# Patient Record
Sex: FEMALE | Race: WHITE | Employment: STUDENT | URBAN - METROPOLITAN AREA
[De-identification: names, ages, dates, MRNs, and addresses within clinical notes are randomized per-mention and may not be internally consistent; named-entity substitution may affect disease eponyms.]

---
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
## 2021-04-01 ENCOUNTER — HOSPITAL ENCOUNTER (INPATIENT)
Age: 21
LOS: 4 days | Discharge: HOME | DRG: 753 | End: 2021-04-05
Attending: PSYCHIATRY & NEUROLOGY | Admitting: PSYCHIATRY & NEUROLOGY
Payer: MEDICAID

## 2021-04-01 PROBLEM — F32.9 MDD (MAJOR DEPRESSIVE DISORDER): Status: ACTIVE | Noted: 2021-04-01

## 2021-04-01 PROCEDURE — 65220000003 HC RM SEMIPRIVATE PSYCH

## 2021-04-01 PROCEDURE — 74011250637 HC RX REV CODE- 250/637: Performed by: NURSE PRACTITIONER

## 2021-04-01 RX ORDER — DIPHENHYDRAMINE HYDROCHLORIDE 50 MG/ML
50 INJECTION, SOLUTION INTRAMUSCULAR; INTRAVENOUS
Status: DISCONTINUED | OUTPATIENT
Start: 2021-04-01 | End: 2021-04-05 | Stop reason: HOSPADM

## 2021-04-01 RX ORDER — HALOPERIDOL 5 MG/ML
5 INJECTION INTRAMUSCULAR
Status: DISCONTINUED | OUTPATIENT
Start: 2021-04-01 | End: 2021-04-05 | Stop reason: HOSPADM

## 2021-04-01 RX ORDER — TRAZODONE HYDROCHLORIDE 50 MG/1
50 TABLET ORAL
Status: DISCONTINUED | OUTPATIENT
Start: 2021-04-01 | End: 2021-04-02

## 2021-04-01 RX ORDER — LORAZEPAM 2 MG/ML
1 INJECTION INTRAMUSCULAR
Status: DISCONTINUED | OUTPATIENT
Start: 2021-04-01 | End: 2021-04-05 | Stop reason: HOSPADM

## 2021-04-01 RX ORDER — LAMOTRIGINE 25 MG/1
25 TABLET ORAL DAILY
Status: DISCONTINUED | OUTPATIENT
Start: 2021-04-02 | End: 2021-04-05 | Stop reason: HOSPADM

## 2021-04-01 RX ORDER — OLANZAPINE 5 MG/1
5 TABLET ORAL
Status: DISCONTINUED | OUTPATIENT
Start: 2021-04-01 | End: 2021-04-05 | Stop reason: HOSPADM

## 2021-04-01 RX ORDER — BENZTROPINE MESYLATE 1 MG/1
1 TABLET ORAL
Status: DISCONTINUED | OUTPATIENT
Start: 2021-04-01 | End: 2021-04-05 | Stop reason: HOSPADM

## 2021-04-01 RX ORDER — ADHESIVE BANDAGE
30 BANDAGE TOPICAL DAILY PRN
Status: DISCONTINUED | OUTPATIENT
Start: 2021-04-01 | End: 2021-04-05 | Stop reason: HOSPADM

## 2021-04-01 RX ORDER — ACETAMINOPHEN 325 MG/1
650 TABLET ORAL
Status: DISCONTINUED | OUTPATIENT
Start: 2021-04-01 | End: 2021-04-05 | Stop reason: HOSPADM

## 2021-04-01 RX ORDER — HYDROXYZINE 50 MG/1
50 TABLET, FILM COATED ORAL
Status: DISCONTINUED | OUTPATIENT
Start: 2021-04-01 | End: 2021-04-05 | Stop reason: HOSPADM

## 2021-04-01 RX ADMIN — TRAZODONE HYDROCHLORIDE 50 MG: 50 TABLET ORAL at 22:12

## 2021-04-01 NOTE — PROGRESS NOTES
Pt arrived at [phone] via EMR  Pt is VOL  Dual skin assesment was performed by Frankie Westbrook, RN and Jarrett Cole RN. Assessment was WDL. Silvano score is 23  Pt did consent to safety while on the unit   Did sign consent forms up on arriving   Denies suicidal ideation   Denies homicidal ideation   Does not appear to be responding to internal stimuli   UDS was negative    Belongings searched and secured by staff    Pt was calm and cooperative during admission process. Will continue to monitor and support q15min        Problem: Falls - Risk of  Goal: *Absence of Falls  Description: Document Mehnaz Rodriguez Fall Risk and appropriate interventions in the flowsheet.   Outcome: Progressing Towards Goal  Note: Fall Risk Interventions:            Medication Interventions: Teach patient to arise slowly       Pt awake in bed, NAD, even respirations, will continue to monitor q15min           0492 Hospitalist paged    0558 [facility] aware

## 2021-04-01 NOTE — PROGRESS NOTES
Problem: Depressed Mood (Adult/Pediatric)  Goal: *STG: Participates in treatment plan  Outcome: Progressing Towards Goal  Note: Up on unit quietly present. Brighter affect when engaged. Depressed mood with increase anxiety. Describes recent breakup with significant other as primary stressor. Intrusive thoughts to self harm have increased. Discussed mood stabilizer benefits such as Lamictal. Pt verbalized understanding. Daily goal is to talk with family and orient to unit.  Staff focus is on offering support and medication education  Goal: *STG: Attends activities and groups  Outcome: Progressing Towards Goal  Goal: *STG: Demonstrates reduction in symptoms and increase in insight into coping skills/future focused  Outcome: Progressing Towards Goal  Goal: *STG: Complies with medication therapy  Outcome: Progressing Towards Goal  Goal: Interventions  Outcome: Progressing Towards Goal

## 2021-04-01 NOTE — INTERDISCIPLINARY ROUNDS
Behavioral Health Interdisciplinary Rounds     Patient Name: Anaya Hansen  Age: 24 y.o. Room/Bed:  731/  Primary Diagnosis: <principal problem not specified>   Admission Status: Voluntary     Readmission within 30 days: no  Power of  in place: no  Patient requires a blocked bed: no          Reason for blocked bed:     VTE Prophylaxis: No    Mobility needs/Fall risk: no  Flu Vaccine : no   Nutritional Plan: no  Consults:          Labs/Testing due today?: no    Sleep hours: 0       Participation in Care/Groups:  New Admit  Medication Compliant?: New Admit  PRNS (last 24 hours): None    Restraints (last 24 hours):  no     CIWA (range last 24 hours):     COWS (range last 24 hours):      Alcohol screening (AUDIT) completed -   AUDIT Score: 4     If applicable, date SBIRT discussed in treatment team AND documented:   AUDIT Screen Score: AUDIT Score: 4    Tobacco - patient is a smoker: Have You Used Tobacco in the Past 30 Days: Yes  Illegal Drugs use: Have You Used Any Illegal Substances Over the Past 12 Months: Yes    24 hour chart check complete: yes     Patient goal(s) for today:   Treatment team focus/goals: Plan to assess for medications and discharge needs. Progress note :She was pleasant and compliant with treatment team.  Agreed to start on medications. LOS:  0  Expected LOS: TBD     Financial concerns/prescription coverage:  [employer]   Family contact:     Mother, Marielos Grossman ([phone])  Father, Gladis Santana ([phone]) SW tried to reach her dad - unable to leave a message      Family requesting physician contact today:    Discharge plan: she will return to her brothers home in [address] [place] [address] to weapons :  Yes       Outpatient provider(s): TBD   Patient's preferred phone number for follow up call :   Patient's preferred e-mail address :    Participating treatment team members: Anaya Hansen, Mike Navarrete - Dr. Melody Severance
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
## 2021-04-01 NOTE — BH NOTES
TRANSFER - IN REPORT:    Verbal report received from Richmond MONTEZ RN(name) on Bella Robert  being received from [facility] ED(unit) for routine progression of care      Report consisted of patients Situation, Background, Assessment and   Recommendations(SBAR). Information from the following report(s) SBAR was reviewed with the receiving nurse. Opportunity for questions and clarification was provided. Assessment completed upon patients arrival to unit and care assumed.

## 2021-04-01 NOTE — CONSULTS
[facility] Adult  Hospitalist Group  History and Physical    Primary Care Provider: Damien Sheriff MD  Date of Service:  4/1/2021    Subjective:     Cecy Cho is a 24 y.o. female with a past medical history of anxiety, depression and ADD presented to [facility] ED with complaints of suicidal ideations that have been increasing over the last month. She was accompanied by her father. She states that she recently started cutting again over the last one month. She was transferred to [address] for further treatment. Hospitalist was consulted for H&P and medical management. History taken from patient and chart. Patient cooperative throughout examination. Denies any medical complaints at this time. States that she is tired because the book she is reading is boring. Denies any syncope, dizziness, shortness of breath, chest pain or tightness, nausea, vomiting or diarrhea. Denies any pain. Review of Systems:    A comprehensive review of systems was negative except for that written in the History of Present Illness. Past Medical History:   Diagnosis Date    Depression     Functional murmur     Self mutilating behavior       History reviewed. No pertinent surgical history. Prior to Admission medications    Medication Sig Start Date End Date Taking? Authorizing Provider   hydrOXYzine HCl (ATARAX) 50 mg tablet Take 1 Tab by mouth every six (6) hours as needed. 6/9/17  Yes Jaime Oseguera MD     Allergies   Allergen Reactions    Latex, Natural Rubber Unknown (comments)    Pcn [Penicillins] Rash    Tree Nut Swelling      History reviewed. No pertinent family history. SOCIAL HISTORY:  Patient resides at Home with brother. Works in [employer]. Does not have any children  Patient ambulates with Independently . Smoking history: Denies   Alcohol history: Drinks ETOH \"socially\", 1 time per week   Ellict drug history: Denies.  UDS negative        Objective:       Physical Exam: Visit Vitals  BP 99/68   Pulse 85   Temp 97.6 °F (36.4 °C)   Resp 16   Ht 5' 4\" (1.626 m)   Wt 47.6 kg (105 lb)   SpO2 99%   Breastfeeding No   BMI 18.02 kg/m²     General appearance: alert, cooperative, no distress, appears stated age  Lungs: clear to auscultation bilaterally  Heart: regular rate and rhythm, S1, S2 normal, no murmur, click, rub or gallop  Abdomen: soft, non-tender. Bowel sounds normal. No masses,  no organomegaly  Extremities: extremities normal, atraumatic, no cyanosis or edema  Pulses: 2+ and symmetric  Skin: Skin color, texture, turgor normal. No rashes or lesions  Neurologic: Grossly normal  Cap refill: Brisk, less than 3 seconds  Pulses: 2+, symmetric in all extremities    Data Review: All diagnostic labs and studies have been reviewed. Assessment:     Active Problems:    MDD (major depressive disorder) (4/1/2021)        Plan:     1. Major depressive disorder / SI/ Anxiety   - Management per primary team         FUNCTIONAL STATUS PRIOR TO HOSPITALIZATION (including history of recent falls): Independent   Thank you for allowing us to participate in patient's care. If you have any questions or need futher assistance, please do not hesitate to contact us again. We will sign off now.      Signed By: Ger Philip NP     April 1, 2021

## 2021-04-01 NOTE — BH NOTES
GROUP THERAPY PROGRESS NOTE     Patient did not participate in self-care group.      HUBER Turner, Supervisee in Social Work

## 2021-04-01 NOTE — H&P
[address] [facility] HISTORY AND PHYSICAL    Name:  Carlton Villaseñor  MR#:  [id number]  :  2000  ACCOUNT #:  [de-identified]  ADMIT DATE:  2021    INITIAL PSYCHIATRIC INTERVIEW    CHIEF COMPLAINT:  \"I was feeling very down. \"    HISTORY OF PRESENT ILLNESS:  The patient is a 80-year-old  female who is currently admitted after she was brought to the ER at [address] [place] [address] by her father and was transferred to us for further care. She reports that she moved to [place] about a year ago, and it has been a stressful transition. Over the past month, she has been feeling increasingly depressed, started cutting again and cut herself on the forearm at least four times in the past month. Prior to that, she had not cut herself for almost 2 years. States that she is having trouble sleeping and has low energy and poor motivation. She has been crying in the shower, feels hopeless about her life and started having thoughts of suicide including by trying to veer her car into traffic and end her life that way. She denies use of alcohol or other recreational substances. Urine drug screen is negative. It was recently the 4th anniversary of the death of her ex-boyfriend who shot himself without any warning to end his life by suicide. States that she still has trouble when she recalls this. Also broke up with a boyfriend recently which was an additional factor. States that she has been off her medications for 2 years and only takes Vistaril occasionally when she feels her anxiety is getting out of control. The patient gives a history of depressive episodes interspersed with periods of mood lability, anger dyscontrol, irritability, impulsive shopping and fast driving. States that she feels more and more like her biological mother who was diagnosed with bipolar disorder. Of note, I see the patient's father Bucky Taylor in my office for treatment, and she was made aware of this.     PAST MEDICAL HISTORY: Reviewed as per the history and physical exam.      Past Medical History:   Diagnosis Date    Depression     Functional murmur     Self mutilating behavior      Prior to Admission medications    Medication Sig Start Date End Date Taking? Authorizing Provider   hydrOXYzine HCl (ATARAX) 50 mg tablet Take 1 Tab by mouth every six (6) hours as needed. 6/9/17  Yes Paola Mark MD     Vitals:    04/01/21 1115 04/01/21 1700 04/01/21 2020 04/02/21 0845   BP: 99/68 97/60 (!) 95/57 94/64   Pulse: 85 80 78 72   Resp: 16 16 16 16   Temp: 97.6 °F (36.4 °C) 98.1 °F (36.7 °C) 98.1 °F (36.7 °C) 98.6 °F (37 °C)   SpO2: 99% 98% 96% 98%   Weight:       Height:         Lab Results   Component Value Date/Time    Hemoglobin (POC) 14.0 08/10/2012 12:40 PM     No results found for: NA, K, CL, CO2, AGAP, GLU, BUN, CREA, BUCR, GFRAA, GFRNA, CA, TBIL, TBILI, AP, TP, ALB, GLOB, AGRAT, ALT, AST  No results found for: VALF2, VALAC, VALP, VALPR, DS6, CRBAM, CRBAMP, CARB2, XCRBAM  No results found for: LITHM  RADIOLOGY REPORTS:(reviewed/updated 4/2/2021)  No results found. PAST PSYCHIATRIC HISTORY:  The patient was hospitalized at the age of 14 after a suicide attempt in [place]. She had been intermittently in treatment including with antidepressants, but over the past 2 years, she has abandoned this and has not seen a therapist also. There is no prior history of substance abuse. She cut herself a couple of times in 2015, but has not done it again until 2 years ago and then stopped again for 2 years. PSYCHOSOCIAL HISTORY:  The patient currently lives in [facility] where she lives in an apartment with her brother. She works at a [email or web address] in [facility] where she is employed as a . States that her job is somewhat stressful, but she still enjoys it. Her father is very supportive of her, and he asked her to come down to [facility] so that she could be hospitalized.   Her parents are  for many years, and as noted above, the mother has a diagnosis of bipolar disorder. Denies any major legal or financial stressors. MENTAL STATUS EXAM:  The patient is a young  female who is dressed in casual street clothes. She is calm, pleasant and cooperative and makes good eye contact. Speech is spontaneous and coherent. Mood is reported as being down, and affect is depressed. Psychomotor activity is within normal limits. Denies any active suicidal ideation or plan. Passive suicidal ideation is present. Denies any perceptual abnormalities. Denies any delusions. Her thought process is logical and goal-directed. Cognitively, she is awake and alert, oriented to TPP. Fund of knowledge is adequate. Insight is poor. Judgment is poor. ASSESSMENT AND PLAN/DIAGNOSIS:  Mood disorder, unspecified; rule out bipolar II disorder, rule out borderline personality disorder, rule out recurrent major depression. At the present time, I will continue her inpatient stay. She will be provided with support and attend groups. Estimated length of stay is 5-7 days. Her strengths include her ability to seek help and support from her father.       MD ANJALI [id number]  D:  04/01/2021 13:35  T:  04/01/2021 15:18  JOB #:  [id number]

## 2021-04-01 NOTE — BH NOTES
GROUP THERAPY PROGRESS NOTE    Patient did not participate in substance abuse group. She remained isolated in her bedroom, in her bed.      HUBER Cox, Supervisee in Social Work

## 2021-04-02 PROCEDURE — 74011250637 HC RX REV CODE- 250/637: Performed by: PSYCHIATRY & NEUROLOGY

## 2021-04-02 PROCEDURE — 65220000003 HC RM SEMIPRIVATE PSYCH

## 2021-04-02 RX ORDER — TRAZODONE HYDROCHLORIDE 100 MG/1
100 TABLET ORAL
Status: DISCONTINUED | OUTPATIENT
Start: 2021-04-02 | End: 2021-04-05 | Stop reason: HOSPADM

## 2021-04-02 RX ADMIN — LAMOTRIGINE 25 MG: 25 TABLET ORAL at 08:53

## 2021-04-02 RX ADMIN — TRAZODONE HYDROCHLORIDE 100 MG: 100 TABLET ORAL at 21:38

## 2021-04-02 NOTE — BH NOTES
2200: PRN Medication Documentation    Specific patient behavior that led to need for PRN medication: inability to sleep  Staff interventions attempted prior to PRN being given: calming techniques  PRN medication given: PO trazadone  Patient response/effectiveness of PRN medication: will continue to monitor

## 2021-04-02 NOTE — PROGRESS NOTES
Problem: Falls - Risk of  Goal: *Absence of Falls  Description: Document Ishmael Ceron Fall Risk and appropriate interventions in the flowsheet.   Outcome: Progressing Towards Goal  Note: Fall Risk Interventions:            Medication Interventions: Teach patient to arise slowly       Pt appears asleep in bed, NAD, even respirations, will continue to monitor q15min

## 2021-04-02 NOTE — BH NOTES
GROUP THERAPY PROGRESS NOTE     Patient did not attend process group.      HUBER Mattson, Supervisee in Social Work

## 2021-04-02 NOTE — PROGRESS NOTES
Problem: Depressed Mood (Adult/Pediatric)  Goal: *STG: Participates in treatment plan  Outcome: Progressing Towards Goal  Pt participates in all therapeutic activities and verbalizes her needs appropriately.

## 2021-04-02 NOTE — INTERDISCIPLINARY ROUNDS
Behavioral Health Interdisciplinary Rounds     Patient Name: Yung Mcgee  Age: 24 y.o. Room/Bed:  731/  Primary Diagnosis: <principal problem not specified>   Admission Status: Voluntary     Readmission within 30 days: no  Power of  in place: no  Patient requires a blocked bed: no          Reason for blocked bed:     VTE Prophylaxis: No    Mobility needs/Fall risk: no  Flu Vaccine : no   Nutritional Plan: no  Consults:          Labs/Testing due today?: no    Sleep hours: 8       Participation in Care/Groups:  yes  Medication Compliant?: Yes  PRNS (last 24 hours): Sleep Aid    Restraints (last 24 hours):  no     CIWA (range last 24 hours):     COWS (range last 24 hours):      Alcohol screening (AUDIT) completed -   AUDIT Score: 4     If applicable, date SBIRT discussed in treatment team AND documented:   AUDIT Screen Score: AUDIT Score: 4      Tobacco - patient is a smoker: Have You Used Tobacco in the Past 30 Days: Yes  Illegal Drugs use: Have You Used Any Illegal Substances Over the Past 12 Months: Yes    24 hour chart check complete: yes     Patient goal(s) for today:   Treatment team focus/goals: Plan to titrate her medications  Progress note : She has been doing well on the unit and has been participating in unit activities . She denies SI, denies thoughts of cutting. She has been isolating in her room. LOS:  1  Expected LOS: plan for discharge on Monday     Financial concerns/prescription coverage:  [employer] Medicaid   Family contact:  Mayi Ross ([phone])       Family requesting physician contact today:    Discharge plan: she will return home   Access to weapons : yes, - lives in her brothers home and has access to weapons      Outpatient provider(s): TBD   Patient's preferred phone number for follow up call :   Patient's preferred e-mail address :[email or web address]   Participating treatment team members: Mj Wells Dr., RN

## 2021-04-02 NOTE — PROGRESS NOTES
Problem: Depressed Mood (Adult/Pediatric)  Goal: *STG: Participates in treatment plan  Outcome: Progressing Towards Goal  Note: Out on unit brighter affect, reports feeling safe and anxiety is tolerable. Discussed medications pt verbalized understanding. Reports sleep slightly improved. Daily goal is to attend groups.  Staff focus is on offering support and coping skills education  Goal: *STG: Attends activities and groups  Outcome: Progressing Towards Goal  Goal: *STG: Demonstrates reduction in symptoms and increase in insight into coping skills/future focused  Outcome: Progressing Towards Goal  Goal: *STG: Complies with medication therapy  Outcome: Progressing Towards Goal  Goal: Interventions  Outcome: Progressing Towards Goal

## 2021-04-02 NOTE — BH NOTES
Chief Complaint:  I feel okay today. Length of Stay: 1 Days    Interval History:  Donna reports feeling slightly better today. Says she slept poorly last night and feels fatigued today. Remains somewhat isolative to her room and encouraged to participate in the milieu. Denies any adverse events from her medications and has not needed any PRN's except for Trazodone which did not help much. No AH or VH are present.  Passive SI remains but feels safe in the hospital.       Past Medical History:  Past Medical History:   Diagnosis Date    Depression     Functional murmur     Self mutilating behavior            Labs:  Lab Results   Component Value Date/Time    Hemoglobin (POC) 14.0 08/10/2012 12:40 PM    No results found for: NA, K, CL, CO2, AGAP, GLU, BUN, CREA, BUCR, GFRAA, GFRNA, CA, TBIL, TBILI, AP, TP, ALB, GLOB, AGRAT, ALT   Vitals:    04/01/21 1115 04/01/21 1700 04/01/21 2020 04/02/21 0845   BP: 99/68 97/60 (!) 95/57 94/64   Pulse: 85 80 78 72   Resp: 16 16 16 16   Temp: 97.6 °F (36.4 °C) 98.1 °F (36.7 °C) 98.1 °F (36.7 °C) 98.6 °F (37 °C)   SpO2: 99% 98% 96% 98%   Weight:       Height:             Current Facility-Administered Medications   Medication Dose Route Frequency Provider Last Rate Last Admin    traZODone (DESYREL) tablet 100 mg  100 mg Oral QHS Annie James MD        OLANZapine (ZyPREXA) tablet 5 mg  5 mg Oral Q6H PRN Turner Baez, NP        haloperidol lactate (HALDOL) injection 5 mg  5 mg IntraMUSCular Q6H PRN Turner Baezom, NP        benztropine (COGENTIN) tablet 1 mg  1 mg Oral BID PRN Turner Baezom, NP        diphenhydrAMINE (BENADRYL) injection 50 mg  50 mg IntraMUSCular BID PRN Turner Baezom, NP        hydrOXYzine HCL (ATARAX) tablet 50 mg  50 mg Oral TID PRN Alva Baez, NP        LORazepam (ATIVAN) injection 1 mg  1 mg IntraMUSCular Q4H PRN Turner Baez, NP        acetaminophen (TYLENOL) tablet 650 mg  650 mg Oral Q4H PRN Michele Baez, NP        magnesium hydroxide (MILK OF MAGNESIA) 400 mg/5 mL oral suspension 30 mL  30 mL Oral DAILY PRN Alva Baez, NP        lamoTRIgine (LaMICtal) tablet 25 mg  25 mg Oral DAILY Can Mckeon MD   25 mg at 04/02/21 0853         Mental Status Exam:  Eye contact: fair  Grooming: fair  Psychomotor activity: Decreased slightly. Speech is spontaneous, coherent  Mood is \"fair\"  Affect: reactive  Perception: Denies any AH or VH  Suicidal ideation: passive SI +  Cognition is grossly intact       Physical Exam:  Body habitus: Body mass index is 18.02 kg/m². Musculoskeletal system: normal gait  Tremor - neg  Cog wheeling - neg      Assessment and Plan:  Donna Steel meets criteria for a diagnosis of Mood disorder, unspecified; rule out bipolar II disorder, rule out borderline personality disorder, rule out recurrent major depression. Increased Trazodone to 100mg at bedtime. Continue the medication regimen as prescribed  Disposition planning to continue. A coordinated, multidisplinary treatment team round was conducted with the patient, nurses, pharmcist,  and writer present. Discussions held with , and/or with family members; Complete current electronic health record for patient was reviewed in full including consultant notes, ancillary staff notes, nurses and tech notes, labs and vitals. I certify that this patients inpatient psychiatric hospital services furnished since the previous certification were, and continue to be, required for treatment that could reasonably be expected to improve the patient's condition, or for diagnostic study, and that the patient continues to need, on a daily basis, active treatment furnished directly by or requiring the supervision of inpatient psychiatric facility personnel.  In addition, the hospital records show that services furnished were intensive treatment services, admission or related services, or equivalent services.

## 2021-04-02 NOTE — BH NOTES
GROUP THERAPY PROGRESS NOTE     Patient did not attend coping skills group     Nicci Baker MSW, Supervisee in Social Work

## 2021-04-03 PROCEDURE — 65220000003 HC RM SEMIPRIVATE PSYCH

## 2021-04-03 PROCEDURE — 74011250637 HC RX REV CODE- 250/637: Performed by: PSYCHIATRY & NEUROLOGY

## 2021-04-03 PROCEDURE — 74011250637 HC RX REV CODE- 250/637: Performed by: NURSE PRACTITIONER

## 2021-04-03 RX ADMIN — LAMOTRIGINE 25 MG: 25 TABLET ORAL at 10:07

## 2021-04-03 RX ADMIN — TRAZODONE HYDROCHLORIDE 100 MG: 100 TABLET ORAL at 21:25

## 2021-04-03 RX ADMIN — HYDROXYZINE HYDROCHLORIDE 50 MG: 50 TABLET, FILM COATED ORAL at 17:49

## 2021-04-03 NOTE — PROGRESS NOTES
Pt appears to be sleeping. No distress noted. Respirations WNL. Will continue to monitor q15 mins for safety. Problem: Falls - Risk of  Goal: *Absence of Falls  Description: Document Eder Plascencia Fall Risk and appropriate interventions in the flowsheet.   Outcome: Progressing Towards Goal  Note: Fall Risk Interventions:            Medication Interventions: Teach patient to arise slowly, Evaluate medications/consider consulting pharmacy          Sleep= 7

## 2021-04-03 NOTE — BH NOTES
Chief Complaint:  I feel good today, I have been tired but I am feeling better. Length of Stay: 2 Days    Interval History:  Donna reports feeling good today. She has been more present on the unit, up in milieu and participating in groups. Reports that she slept better last night with increased dose of trazodone. She denies any side effects from medications, discussed what possible reactions to look for, patient verbalized understand. Denies SI, HI or AVH. She is currently discharge focused and requesting information about what time she will be discharged on Monday.      Past Medical History:  Past Medical History:   Diagnosis Date    Depression     Functional murmur     Self mutilating behavior            Labs:  Lab Results   Component Value Date/Time    Hemoglobin (POC) 14.0 08/10/2012 12:40 PM    No results found for: NA, K, CL, CO2, AGAP, GLU, BUN, CREA, BUCR, GFRAA, GFRNA, CA, TBIL, TBILI, AP, TP, ALB, GLOB, AGRAT, ALT   Vitals:    04/02/21 0845 04/02/21 1646 04/02/21 2046 04/03/21 0922   BP: 94/64 100/64 95/69 100/63   Pulse: 72 69 75 83   Resp: 16 16 16 16   Temp: 98.6 °F (37 °C) 98.6 °F (37 °C) 98.2 °F (36.8 °C) 98.3 °F (36.8 °C)   SpO2: 98% 98% 97% 97%   Weight:       Height:             Current Facility-Administered Medications   Medication Dose Route Frequency Provider Last Rate Last Admin    traZODone (DESYREL) tablet 100 mg  100 mg Oral QHS Micheline Lara MD   100 mg at 04/02/21 2138    OLANZapine (ZyPREXA) tablet 5 mg  5 mg Oral Q6H PRN Ton Baez, NP        haloperidol lactate (HALDOL) injection 5 mg  5 mg IntraMUSCular Q6H PRN Ton Baez, NP        benztropine (COGENTIN) tablet 1 mg  1 mg Oral BID PRN Tno Baez, NP        diphenhydrAMINE (BENADRYL) injection 50 mg  50 mg IntraMUSCular BID PRN Hassan-Cha, Alva D, NP        hydrOXYzine HCL (ATARAX) tablet 50 mg  50 mg Oral TID PRN Ton Baez, NP  LORazepam (ATIVAN) injection 1 mg  1 mg IntraMUSCular Q4H PRN Raeann Baez NP        acetaminophen (TYLENOL) tablet 650 mg  650 mg Oral Q4H PRN Raeann Baez, NP        magnesium hydroxide (MILK OF MAGNESIA) 400 mg/5 mL oral suspension 30 mL  30 mL Oral DAILY PRN Alva Baez, REMEDIOS        lamoTRIgine (LaMICtal) tablet 25 mg  25 mg Oral DAILY Ashley Pascual MD   25 mg at 04/03/21 1007         Mental Status Exam:  Eye contact: fair  Grooming: fair  Psychomotor activity: Decreased slightly. Speech is spontaneous, coherent  Mood is \"fair\"  Affect: reactive  Perception: Denies any AH or VH  Suicidal ideation:Denies SI. Cognition is grossly intact       Physical Exam:  Body habitus: Body mass index is 18.02 kg/m². Musculoskeletal system: normal gait  Tremor - neg  Cog wheeling - neg      Assessment and Plan:  Donna Steel meets criteria for a diagnosis of Mood disorder, unspecified; rule out bipolar II disorder, rule out borderline personality disorder, rule out recurrent major depression. Reports that increased dose of trazodone was beneficial for sleep   Continue the medication regimen as prescribed  Disposition planning to continue. A coordinated, multidisplinary treatment team round was conducted with the patient, nurses, pharmcist,  and writer present. Discussions held with , and/or with family members; Complete current electronic health record for patient was reviewed in full including consultant notes, ancillary staff notes, nurses and tech notes, labs and vitals.   I certify that this patients inpatient psychiatric hospital services furnished since the previous certification were, and continue to be, required for treatment that could reasonably be expected to improve the patient's condition, or for diagnostic study, and that the patient continues to need, on a daily basis, active treatment furnished directly by or requiring the supervision of inpatient psychiatric facility personnel. In addition, the hospital records show that services furnished were intensive treatment services, admission or related services, or equivalent services.

## 2021-04-03 NOTE — PROGRESS NOTES
PRN Medication Documentation    Specific patient behavior that led to need for PRN medication: Restless, anxious, patient reports, \"mood going down. \"     Staff interventions attempted prior to PRN being given: Therapeutic communication, use of coping skills, being around others, socializing     PRN medication given: Atarax 50mg PO     Patient response/effectiveness of PRN medication: Will continue to monitor. Problem: Depressed Mood (Adult/Pediatric)  Goal: *STG: Participates in treatment plan  Outcome: Progressing Towards Goal  Goal: *STG: Attends activities and groups  Outcome: Progressing Towards Goal  Goal: *STG: Demonstrates reduction in symptoms and increase in insight into coping skills/future focused  Outcome: Progressing Towards Goal    1600: Patient received awake and alert and sitting quietly in the dining room watching TV. Mood and affect; calm, cooperative, smiling and pleasant. Denies SI/HI. Will continue to monitor q15 minutes for safety checks.

## 2021-04-03 NOTE — PROGRESS NOTES
Problem: Falls - Risk of  Goal: *Absence of Falls  Description: Document Aissatou Vazquezidel Fall Risk and appropriate interventions in the flowsheet.   Outcome: Progressing Towards Goal  Note: Fall Risk Interventions:            Medication Interventions: Teach patient to arise slowly, Evaluate medications/consider consulting pharmacy

## 2021-04-03 NOTE — PROGRESS NOTES
[facility]  Master Treatment Plan for Vinod & Luca    Date Treatment Plan Initiated: 4/3/21    Treatment Plan Modalities:  Type of Modality Amount  (x minutes) Frequency (x/week) Duration (x days) Name of Responsible Staff   [address] [facility] meetings to encourage peer interactions 15 7 1 OBIE Fung     Group psychotherapy to assist in building coping skills and internal controls 60 7 1 Cesar Gong   Therapeutic activity groups to build coping skills 60 7 1 Cesar Gong   Psychoeducation in group setting to address:   Medication education   [address] PharmD   Coping skills   30 3 1 Cesar Gong   Relaxation techniques         Symptom management         Discharge planning   60 2 [facility]    60 2 1 Chaplain PEÑA   61 1 1 Volunteer of [facility]/AA/NA         Physician medication management   13 7 1 Dr. Vini Saunders meeting/discharge planning   15 2 1 Mary Sánchez and Nicci Perez                                Goal will be met by 4/6/21:    Problem: Falls - Risk of  Goal: *Absence of Falls  Description: Document Carlene Fall Risk and appropriate interventions in the flowsheet. Outcome: Progressing Towards Goal  Note: Fall Risk Interventions:            Medication Interventions: Teach patient to arise slowly                   Problem: Patient Education: Go to Patient Education Activity  Goal: Patient/Family Education  Outcome: Progressing Towards Goal     Problem: Depressed Mood (Adult/Pediatric)  Goal: *STG: Participates in treatment plan  Outcome: Progressing Towards Goal  Note: Pt participated in treatment team. Able to sleep last night. Interacting with peers and staff. Goal: *STG: Attends activities and groups  Outcome: Progressing Towards Goal  Note: Attending groups on the unit and participating.   Goal: *STG: Demonstrates reduction in symptoms and increase in insight into coping skills/future focused  Outcome: Progressing Towards Goal  Note: Pt feeling less depressed at this time. No thoughts of wanting to harm herself. Encouraged pt to journal feelings. Goal: *STG: Complies with medication therapy  Outcome: Progressing Towards Goal  Note: Taking medications as scheduled.   Goal: Interventions  Outcome: Progressing Towards Goal     Problem: Patient Education: Go to Patient Education Activity  Goal: Patient/Family Education  Outcome: Progressing Towards Goal

## 2021-04-03 NOTE — SUICIDE SAFETY PLAN
SAFETY PLAN    A suicide Safety Plan is a document that supports someone when they are having thoughts of suicide. Warning Signs that indicate a suicidal crisis may be developing: What (situations, thoughts, feelings, body sensations, behaviors, etc.) do you experience that lets you know you are beginning to think about suicide? 1. Break up  2. Feeling too stressed from work  3. Feeling lonely    Internal Coping Strategies:  What things can I do (relaxation techniques, hobbies, physical activities, etc.) to take my mind off my problems without contacting another person? 1. Going for a walk with Sarah  2. Hanging out with Friends  3. Doing yoga with [place] Cons and social settings that provide distraction: Who can I call or where can I go to distract me? 1. Name: Mom  Phone: [phone]  2. Name: Dad  Phone: [phone]  3. Place: Going for a walk          4. Place: Laying in the sun    People whom I can ask for help: Who can I call when I need help - for example, friends, family, clergy, someone else? 1. Name: Mom            Phone: [phone]  2. Name: Mayi  Phone: [phone]  3. Name: Maria Elena Dukes Phone: in cell phone    Professionals or [employer] agencies I can contact during a crisis: Who can I call for help - for example, my doctor, my psychiatrist, my psychologist, a mental health provider, a suicide hotline? 1. Clinician Name:    Phone:       Clinician Pager or Emergency Contact #:     2. Clinician Name:    Phone:       Clinician Pager or Emergency Contact #:     3. Suicide Prevention Lifeline: 6-030-045-TALK (7095)    4. [address] [place] [address] Emergency Services -  for example, [facility] suicide hotline, [facility] Hotline:   [address]        Emergency Services Address:       Emergency Services Phone:     Making the environment safe: How can I make my environment (house/apartment/living space) safer?  For example, can I remove guns, medications, and other items? 1. Lock up guns  2.  Take medication and start getting therapy

## 2021-04-03 NOTE — BH NOTES
GROUP THERAPY PROGRESS NOTE     Patient is participating in Discharge/Goals/Community Meeting Group. Group time: 50 minutes     Personal goal for participation: Process feelings related to discharge and/or feelings/goals for today. Goal orientation: Personal     Group therapy participation: active     Therapeutic interventions reviewed and discussed: Group discussion was focused on discharge plans and anxiety related to this. Group members discussed what they planned to do once discharge and discharge plans. Discussion also related to support and communication issues that arise. Group members verbalized how they are feeling today, their personal goal for today, and goals for the week. Patients were given an opportunity to share any concerns and issues they were having. Patients completed safety plan. Impression of participation: Donna actively participated in group. Patient completed her safety plan. Calm, pleasant and cooperative in group. Pt reports no questions about discharge.       Cesar Gong, Supervisee in Social Work

## 2021-04-04 PROCEDURE — 74011250637 HC RX REV CODE- 250/637: Performed by: PSYCHIATRY & NEUROLOGY

## 2021-04-04 PROCEDURE — 74011250637 HC RX REV CODE- 250/637: Performed by: NURSE PRACTITIONER

## 2021-04-04 PROCEDURE — 65220000003 HC RM SEMIPRIVATE PSYCH

## 2021-04-04 RX ADMIN — TRAZODONE HYDROCHLORIDE 100 MG: 100 TABLET ORAL at 21:48

## 2021-04-04 RX ADMIN — HYDROXYZINE HYDROCHLORIDE 50 MG: 50 TABLET, FILM COATED ORAL at 21:48

## 2021-04-04 RX ADMIN — LAMOTRIGINE 25 MG: 25 TABLET ORAL at 10:37

## 2021-04-04 NOTE — PROGRESS NOTES
Problem: Falls - Risk of  Goal: *Absence of Falls  Description: Document Wilsey Fall Risk and appropriate interventions in the flowsheet. Outcome: Progressing Towards Goal  Note: Fall Risk Interventions:       Medication Interventions: Teach patient to arise slowly    Problem: Patient Education: Go to Patient Education Activity  Goal: Patient/Family Education  Outcome: Progressing Towards Goal     Problem: Depressed Mood (Adult/Pediatric)  Goal: *STG: Participates in treatment plan  Outcome: Progressing Towards Goal  Note: Pt participated in treatment team. Stated she was \"tossing and turning\" last night. Pt has concerns regarding her medications not working after 5 pm. Reviewed medications during treatment team. Denies any thoughts of wanting to harm herself. Contact her parents yesterday. Mood is brighter.    Goal: Interventions  Outcome: Progressing Towards Goal     Problem: Patient Education: Go to Patient Education Activity  Goal: Patient/Family Education  Outcome: Progressing Towards Goal

## 2021-04-04 NOTE — BH NOTES
Chief Complaint:  I am feeling good, just tired. Length of Stay: 3 Days    Interval History:  Donna reports feeling good today. She has been up in milieu and participating in groups. Reports that sleep was intermittent last night, reports that she was tossing and turning. She denies any side effects from medications, discussed what possible reactions to look for, patient verbalized understand. Denies SI, HI or AVH. Reports that she has talked to her mom and dad and has plans to have dinner with her mom tomorrow after discharge. Reports that she has plans to go back to [facility] to live with her brother and then she plans to return to work on Tuesday.      Past Medical History:  Past Medical History:   Diagnosis Date    Depression     Functional murmur     Self mutilating behavior            Labs:  Lab Results   Component Value Date/Time    Hemoglobin (POC) 14.0 08/10/2012 12:40 PM    No results found for: NA, K, CL, CO2, AGAP, GLU, BUN, CREA, BUCR, GFRAA, GFRNA, CA, TBIL, TBILI, AP, TP, ALB, GLOB, AGRAT, ALT   Vitals:    04/03/21 0922 04/03/21 1134 04/03/21 1630 04/04/21 0829   BP: 100/63 100/67 101/68 97/64   Pulse: 83 61 67 71   Resp: 16 16 16 16   Temp: 98.3 °F (36.8 °C) 98.2 °F (36.8 °C) 97.8 °F (36.6 °C) 97.9 °F (36.6 °C)   SpO2: 97% 98% 100% 99%   Weight:    49.8 kg (109 lb 12.8 oz)   Height:             Current Facility-Administered Medications   Medication Dose Route Frequency Provider Last Rate Last Admin    traZODone (DESYREL) tablet 100 mg  100 mg Oral QHS Naif Figueredo MD   100 mg at 04/03/21 2125    OLANZapine (ZyPREXA) tablet 5 mg  5 mg Oral Q6H PRN Eloisa Baez NP        haloperidol lactate (HALDOL) injection 5 mg  5 mg IntraMUSCular Q6H PRN Alva Baez NP        benztropine (COGENTIN) tablet 1 mg  1 mg Oral BID PRN Eloisa Baez, REMEDIOS        diphenhydrAMINE (BENADRYL) injection 50 mg  50 mg IntraMUSCular BID PRN Jeyson ROMEO, REMEDIOS        hydrOXYzine HCL (ATARAX) tablet 50 mg  50 mg Oral TID PRN Alva Baez NP   50 mg at 04/03/21 1749    LORazepam (ATIVAN) injection 1 mg  1 mg IntraMUSCular Q4H PRN Dustin Baez NP        acetaminophen (TYLENOL) tablet 650 mg  650 mg Oral Q4H PRN Dustin Baez NP        magnesium hydroxide (MILK OF MAGNESIA) 400 mg/5 mL oral suspension 30 mL  30 mL Oral DAILY PRN Alva Baez NP        lamoTRIgine (LaMICtal) tablet 25 mg  25 mg Oral DAILY Za Mac MD   25 mg at 04/03/21 1007         Mental Status Exam:  Eye contact: fair  Grooming: fair  Psychomotor activity: Decreased slightly. Speech is spontaneous, coherent  Mood is \"fair\"  Affect: reactive  Perception: Denies any AH or VH  Suicidal ideation:Denies SI. Cognition is grossly intact       Physical Exam:  Body habitus: Body mass index is 18.85 kg/m². Musculoskeletal system: normal gait  Tremor - neg  Cog wheeling - neg      Assessment and Plan:  Donna Steel meets criteria for a diagnosis of Mood disorder, unspecified; rule out bipolar II disorder, rule out borderline personality disorder, rule out recurrent major depression. Reports that increased dose of trazodone was beneficial for sleep   Continue the medication regimen as prescribed  Disposition planning to continue. A coordinated, multidisplinary treatment team round was conducted with the patient, nurses, pharmcist,  and writer present. Discussions held with , and/or with family members; Complete current electronic health record for patient was reviewed in full including consultant notes, ancillary staff notes, nurses and tech notes, labs and vitals.   I certify that this patients inpatient psychiatric hospital services furnished since the previous certification were, and continue to be, required for treatment that could reasonably be expected to improve the patient's condition, or for diagnostic study, and that the patient continues to need, on a daily basis, active treatment furnished directly by or requiring the supervision of inpatient psychiatric facility personnel. In addition, the hospital records show that services furnished were intensive treatment services, admission or related services, or equivalent services.       MARGOT Gallegos

## 2021-04-04 NOTE — PROGRESS NOTES
Problem: Patient Education: Go to Patient Education Activity  Goal: Patient/Family Education  Outcome: Progressing Towards Goal     Problem: Depressed Mood (Adult/Pediatric)  Goal: Interventions  Outcome: Progressing Towards Goal     Problem: Patient Education: Go to Patient Education Activity  Goal: Patient/Family Education  Outcome: Progressing Towards Goal

## 2021-04-04 NOTE — PROGRESS NOTES
Pt resting in bed, appears asleep. NAD. Respirations even and unlabored. Will continue to monitor q15 mins for safety. Problem: Falls - Risk of  Goal: *Absence of Falls  Description: Document Edgar Villaseñor Fall Risk and appropriate interventions in the flowsheet.   Outcome: Progressing Towards Goal  Note: Fall Risk Interventions:            Medication Interventions: Teach patient to arise slowly         Sleep= 6

## 2021-04-04 NOTE — BH NOTES
GROUP THERAPY PROGRESS NOTE     Patient did not participate in Self-Care Group.       Cesar Gong, Supervisee in Social Work

## 2021-04-05 VITALS
WEIGHT: 109.8 LBS | DIASTOLIC BLOOD PRESSURE: 58 MMHG | HEIGHT: 64 IN | SYSTOLIC BLOOD PRESSURE: 93 MMHG | OXYGEN SATURATION: 95 % | RESPIRATION RATE: 16 BRPM | TEMPERATURE: 97.8 F | HEART RATE: 84 BPM | BODY MASS INDEX: 18.74 KG/M2

## 2021-04-05 PROCEDURE — 74011250637 HC RX REV CODE- 250/637: Performed by: PSYCHIATRY & NEUROLOGY

## 2021-04-05 RX ORDER — LAMOTRIGINE 25 MG/1
TABLET ORAL
Qty: 79 TAB | Refills: 0 | Status: SHIPPED | OUTPATIENT
Start: 2021-04-05 | End: 2022-09-01

## 2021-04-05 RX ORDER — HYDROXYZINE 50 MG/1
50 TABLET, FILM COATED ORAL
Qty: 90 TAB | Refills: 1 | Status: SHIPPED | OUTPATIENT
Start: 2021-04-05 | End: 2022-09-01

## 2021-04-05 RX ORDER — LAMOTRIGINE 150 MG/1
150 TABLET ORAL DAILY
Qty: 30 TAB | Refills: 0 | Status: SHIPPED | OUTPATIENT
Start: 2021-04-05 | End: 2022-09-01

## 2021-04-05 RX ORDER — TRAZODONE HYDROCHLORIDE 100 MG/1
100 TABLET ORAL
Qty: 30 TAB | Refills: 1 | Status: SHIPPED | OUTPATIENT
Start: 2021-04-05 | End: 2022-09-01

## 2021-04-05 RX ADMIN — LAMOTRIGINE 25 MG: 25 TABLET ORAL at 08:54

## 2021-04-05 NOTE — PROGRESS NOTES
Problem: Falls - Risk of  Goal: *Absence of Falls  Description: Document Edward [place] Fall Risk and appropriate interventions in the flowsheet. Outcome: Progressing Towards Goal  Note: Fall Risk Interventions:     Medication Interventions: Teach patient to arise slowly     Problem: Patient Education: Go to Patient Education Activity  Goal: Patient/Family Education  Outcome: Progressing Towards Goal     Problem: Depressed Mood (Adult/Pediatric)  Goal: *STG: Participates in treatment plan  Outcome: Progressing Towards Goal  Note: Pt out out on the unit and interacting with peers. Mood is brighter and stated she is feeling less depressed. Denies any thoughts of self harm. Focused on discharge. Attending groups during the shift.    Goal: Interventions  Outcome: Progressing Towards Goal     Problem: Patient Education: Go to Patient Education Activity  Goal: Patient/Family Education  Outcome: Progressing Towards Goal

## 2021-04-05 NOTE — PROGRESS NOTES
Pharmacist Discharge Medication Reconciliation    Discharging Provider: Dr. Godwin Garcia PMH:   Past Medical History:   Diagnosis Date    Depression     Functional murmur     Self mutilating behavior      Chief Complaint for this Admission: No chief complaint on file. Allergies: Latex, natural rubber; Pcn [penicillins]; and Tree nut    Discharge Medications:   Current Discharge Medication List        START taking these medications    Details   !! lamoTRIgine (LaMICtal) 25 mg tablet Take 1 tablet daily for 9 days, 1 tab BID for 7 days, 2 tabs BID for 14 days. Indications: mood  Qty: 79 Tab, Refills: 0      traZODone (DESYREL) 100 mg tablet Take 1 Tab by mouth nightly. Indications: insomnia associated with depression  Qty: 30 Tab, Refills: 1      !! lamoTRIgine (LaMICtal) 150 mg tablet Take 1 Tab by mouth daily. Indications: bipolar depression  Qty: 30 Tab, Refills: 0       !! - Potential duplicate medications found. Please discuss with provider. CONTINUE these medications which have CHANGED    Details   hydrOXYzine HCL (ATARAX) 50 mg tablet Take 1 Tab by mouth every six (6) hours as needed for Anxiety.  Indications: anxious  Qty: 90 Tab, Refills: 1           The patient's chart, MAR and AVS were reviewed by Ivan Ring, FABRICIO.

## 2021-04-05 NOTE — INTERDISCIPLINARY ROUNDS
Behavioral Health Interdisciplinary Rounds     Patient Name: Nixon Arcos  Age: 24 y.o. Room/Bed:  731/  Primary Diagnosis: <principal problem not specified>   Admission Status: Voluntary     Readmission within 30 days: no  Power of  in place: no  Patient requires a blocked bed: no          Reason for blocked bed:     VTE Prophylaxis: No    Mobility needs/Fall risk: no  Flu Vaccine :    Nutritional Plan: no  Consults:          Labs/Testing due today?: no    Sleep hours:  6      Participation in Care/Groups:  yes  Medication Compliant?: Yes  PRNS (last 24 hours): Antianxiety    Restraints (last 24 hours):  no     CIWA (range last 24 hours):     COWS (range last 24 hours):      Alcohol screening (AUDIT) completed -   AUDIT Score: 4     If applicable, date SBIRT discussed in treatment team AND documented:   AUDIT Screen Score: AUDIT Score: 4    Tobacco - patient is a smoker: Have You Used Tobacco in the Past 30 Days: Yes  Illegal Drugs use: Have You Used Any Illegal Substances Over the Past 12 Months: Yes    24 hour chart check complete: yes     Patient goal(s) for today:   Treatment team focus/goals: Plan for discharge today. Progress note : She has been doing well on the unit, no behavioral issues     LOS:  4  Expected LOS: Today     Financial concerns/prescription coverage:  [employer] +   Family contact:  Brother - [phone]      Family requesting physician contact today:    Discharge plan:  She will return home with her brother   Access to weapons :  Yes, call brother  HANS spoke to her brother and he has secured the weapons in the home        Outpatient provider(s): [address] [place] [address]   Patient's preferred phone number for follow up call :   Patient's preferred e-mail address :  Participating treatment team members: Charly Nathan Dr.

## 2021-04-05 NOTE — DISCHARGE SUMMARY
DISCHARGE SUMMARY    Some parts of the discharge summary are from the initial Psychiatric interview that was done on admission by the admitting psychiatrist.     Date of Admission: 4/1/2021    Date of Discharge: 4/5/2021     TYPE OF DISCHARGE:   REGULAR -  YES    AMA  RELEASED BY THE [facility] COURT    CHIEF COMPLAINT:  \"I was feeling very down. \"     HISTORY OF PRESENT ILLNESS:  The patient is a 71-year-old  female who is currently admitted after she was brought to the ER at [facility] by her father and was transferred to us for further care. She reports that she moved to [place] about a year ago, and it has been a stressful transition. Over the past month, she has been feeling increasingly depressed, started cutting again and cut herself on the forearm at least four times in the past month. Prior to that, she had not cut herself for almost 2 years. States that she is having trouble sleeping and has low energy and poor motivation. She has been crying in the shower, feels hopeless about her life and started having thoughts of suicide including by trying to veer her car into traffic and end her life that way. She denies use of alcohol or other recreational substances. Urine drug screen is negative. It was recently the 4th anniversary of the death of her ex-boyfriend who shot himself without any warning to end his life by suicide. States that she still has trouble when she recalls this. Also broke up with a boyfriend recently which was an additional factor. States that she has been off her medications for 2 years and only takes Vistaril occasionally when she feels her anxiety is getting out of control. The patient gives a history of depressive episodes interspersed with periods of mood lability, anger dyscontrol, irritability, impulsive shopping and fast driving. States that she feels more and more like her biological mother who was diagnosed with bipolar disorder.   Of note, I see the patient's father Beny Mendoza in my office for treatment, and she was made aware of this.     PAST MEDICAL HISTORY:  Reviewed as per the history and physical exam.             Past Medical History:   Diagnosis Date    Depression      Functional murmur      Self mutilating behavior                Prior to Admission medications    Medication Sig Start Date End Date Taking? Authorizing Provider   hydrOXYzine HCl (ATARAX) 50 mg tablet Take 1 Tab by mouth every six (6) hours as needed. 6/9/17   Yes Jaime Oseguera MD             Vitals:     04/01/21 1115 04/01/21 1700 04/01/21 2020 04/02/21 0845   BP: 99/68 97/60 (!) 95/57 94/64   Pulse: 85 80 78 72   Resp: 16 16 16 16   Temp: 97.6 °F (36.4 °C) 98.1 °F (36.7 °C) 98.1 °F (36.7 °C) 98.6 °F (37 °C)   SpO2: 99% 98% 96% 98%   Weight:           Height:                    Lab Results   Component Value Date/Time     Hemoglobin (POC) 14.0 08/10/2012 12:40 PM      No results found for: NA, K, CL, CO2, AGAP, GLU, BUN, CREA, BUCR, GFRAA, GFRNA, CA, TBIL, TBILI, AP, TP, ALB, GLOB, AGRAT, ALT, AST  No results found for: VALF2, VALAC, VALP, VALPR, DS6, CRBAM, CRBAMP, CARB2, XCRBAM  No results found for: LITHM  RADIOLOGY REPORTS:(reviewed/updated 4/2/2021)  No results found.        PAST PSYCHIATRIC HISTORY:  The patient was hospitalized at the age of 14 after a suicide attempt in [place]. She had been intermittently in treatment including with antidepressants, but over the past 2 years, she has abandoned this and has not seen a therapist also. There is no prior history of substance abuse. She cut herself a couple of times in 2015, but has not done it again until 2 years ago and then stopped again for 2 years.     PSYCHOSOCIAL HISTORY:  The patient currently lives in [facility] where she lives in an apartment with her brother. She works at a [email or web address] in [facility] where she is employed as a . States that her job is somewhat stressful, but she still enjoys it.   Her father is very supportive of her, and he asked her to come down to [place] so that she could be hospitalized. Her parents are  for many years, and as noted above, the mother has a diagnosis of bipolar disorder. Denies any major legal or financial stressors.     MENTAL STATUS EXAM:  The patient is a young  female who is dressed in casual street clothes. She is calm, pleasant and cooperative and makes good eye contact. Speech is spontaneous and coherent. Mood is reported as being down, and affect is depressed. Psychomotor activity is within normal limits. Denies any active suicidal ideation or plan. Passive suicidal ideation is present. Denies any perceptual abnormalities. Denies any delusions. Her thought process is logical and goal-directed. Cognitively, she is awake and alert, oriented to TPP. Fund of knowledge is adequate. Insight is poor. Judgment is poor.     ASSESSMENT AND PLAN/DIAGNOSIS:  Mood disorder, unspecified; rule out bipolar II disorder, rule out borderline personality disorder, rule out recurrent major depression.     At the present time, I will continue her inpatient stay. She will be provided with support and attend groups. Estimated length of stay is 5-7 days. Her strengths include her ability to seek help and support from her father. COURSE IN THE HOSPITAL:    Patient was admitted to the inpatient psychiatry unit for acute psychiatric stabilization in regards to symptomatology as described in the HPI above and placed on Q15 minute checks and withdrawal precautions. While on the unit Jamin Kelley was involved in individual, group, occupational and milieu therapy. She was started back on her usual medication regimen as well as PRN medications including Lamotrigine, Trazodone, Vistaril for anxiety. She improved gradually and was able to integrate into the milieu with help from the nursing staff.  Patients symptoms improved gradually including low moods, poor sleep, SI, low energy and motivation. She was quite on the unit, appropriate in her interactions, and cooperative with medications and the unit routine. Please see individual progress notes for more specific details regarding patient's hospitalization course. Patient was discharged as per the plan. She had been doing well on the unit as per the report of the nursing staff and my observations. No PRN medication for agitation, seclusion or restraints were required during the last 48 hours of her stay. Yung Mcgee had improved progressively to the point of being stable for discharge and outpatient FU. At this time she did not offer any complaints. Patient denied any SI or HI. Denied any AH or VH. She denied any delusions. Was not considered a danger to self or to others and is safe for discharge. Will FU with her appointments and remains motivated to be in treatment. The patient verbalized understanding of her discharge instructions. DISCHARGE DIAGNOSIS:  Mood disorder, unspecified; rule out bipolar II disorder, rule out borderline personality disorder, rule out recurrent major depression. MENTAL STATUS EXAM ON DISCHARGE:    General appearance:   Yung Mcgee is a 24 y.o. WHITE female who is well groomed, psychomotor activity is WNL  Eye contact: makes good eye contact  Speech: Spontaneous and coherent  Affect : Euthymic  Mood: \"OK\"  Thought Process: Logical, goal directed  Perception: Denies any AH or VH. Thought Content: Denies any SI or Plan  Insight: Partial  Judgement: Fair  Cognition: Intact grossly. Current Discharge Medication List      START taking these medications    Details   !! lamoTRIgine (LaMICtal) 25 mg tablet Take 1 tablet daily for 9 days, 1 tab BID for 7 days, 2 tabs BID for 14 days. Indications: mood  Qty: 79 Tab, Refills: 0      traZODone (DESYREL) 100 mg tablet Take 1 Tab by mouth nightly.  Indications: insomnia associated with depression  Qty: 30 Tab, Refills: 1      !! lamoTRIgine (LaMICtal) 150 mg tablet Take 1 Tab by mouth daily. Indications: bipolar depression  Qty: 30 Tab, Refills: 0       !! - Potential duplicate medications found. Please discuss with provider. CONTINUE these medications which have CHANGED    Details   hydrOXYzine HCL (ATARAX) 50 mg tablet Take 1 Tab by mouth every six (6) hours as needed for Anxiety. Indications: anxious  Qty: 90 Tab, Refills: 1              No results found for: VALF2, VALAC, VALP, VALPR, DS6, CRBAM, CRBAMP, CARB2, XCRBAM  No results found for: LITHM    Follow-up Information     Follow up With Specialties Details Why Contact Info    Edilia Guzmán MD Family [place] [id number] [address]  On 5/24/2021 you will have a 10:00 am virtual  appointment with Juwan Henderson NP    [address] [place] [address] [phone]         WOUND CARE: none needed. PROGNOSIS:   Good / Fair based on nature of patient's pathology/ies and treatment compliance issues. Prognosis is greatly dependent upon patient's ability to  follow up on psychiatric/psychotherapy appointments as well as to comply with psychiatric medications as prescribed.

## 2021-04-05 NOTE — BH NOTES
GROUP THERAPY PROGRESS NOTE    Patient did not participate in Process Group     Cesar Gong, Supervisee in Social Work

## 2021-04-05 NOTE — BH NOTES
Behavioral Health Transition Record to Provider    Patient Name: Vinod Segovia  YOB: 2000  Medical Record Number: [id number]  Date of Admission: 4/1/2021  Date of Discharge: 4/5/2021     Attending Provider: No att. providers found  Discharging Provider: Dr. Reyna Rounds   To contact this individual call [phone] and ask the  to page. If unavailable, ask to be transferred to [facility] Provider on call. [facility] Provider will be available on call 24/7 and during holidays. Primary Care Provider: Vonnie Trinidad MD    Allergies   Allergen Reactions    Latex, Natural Rubber Unknown (comments)    Pcn [Penicillins] Rash    Tree Nut Swelling       Reason for Admission: CHIEF COMPLAINT:  \"I was feeling very down. \"     HISTORY OF PRESENT ILLNESS:  The patient is a 28-year-old  female who is currently admitted after she was brought to the ER at [facility] by her father and was transferred to us for further care.  She reports that she moved to [place] about a year ago, and it has been a stressful transition.  Over the past month, she has been feeling increasingly depressed, started cutting again and cut herself on the forearm at least four times in the past month. Tania Peer to that, she had not cut herself for almost 2 years.  States that she is having trouble sleeping and has low energy and poor motivation. Quita Guadarrama has been crying in the shower, feels hopeless about her life and started having thoughts of suicide including by trying to veer her car into traffic and end her life that way.  She denies use of alcohol or other recreational substances.  Urine drug screen is negative.  It was recently the 4th anniversary of the death of her ex-boyfriend who shot himself without any warning to end his life by suicide.  States that she still has trouble when she recalls this. Aicha Mireles broke up with a boyfriend recently which was an additional factor.  States that she has been off her medications for 2 years and only takes Vistaril occasionally when she feels her anxiety is getting out of control.  The patient gives a history of depressive episodes interspersed with periods of mood lability, anger dyscontrol, irritability, impulsive shopping and fast driving.  States that she feels more and more like her biological mother who was diagnosed with bipolar disorder.  Of note, I see the patient's father Joana Adan in my office for treatment, and she was made aware of this.       Admission Diagnosis: MDD (major depressive disorder) [F32.9]    * No surgery found *    Results for orders placed or performed in visit on 08/10/12   URINALYSIS W/MICROSCOPIC   Result Value Ref Range    Specific Gravity 1.020 1.005 - 1.030    pH (UA) 7.5 5.0 - 7.5    Color Yellow Yellow    Appearance Cloudy (A) Clear    Leukocyte Esterase Negative Negative    Protein Trace Negative/Trace    Glucose Trace (A) Negative    Ketone Negative Negative    Blood Negative Negative    Bilirubin Negative Negative    Urobilinogen 0.2 0.0 - 1.9 mg/dL    Nitrites Negative Negative    Microscopic Examination Comment     Microscopic exam See additional order    CHOLESTEROL, TOTAL   Result Value Ref Range    Cholesterol, total 191 (H) 100 - 169 mg/dL   MICROSCOPIC EXAMINATION   Result Value Ref Range    WBC None seen 0 - 5 /hpf    RBC None seen 0 - 3 /hpf    Epithelial cells 0-10 0 - 10 /hpf    Crystals Present (A) N/A    Crystal type Amorphous Sediment N/A    Bacteria None seen None seen/Few   AMB POC URINALYSIS DIP STICK AUTO W/O MICRO   Result Value Ref Range    Color (UA POC) Yellow (none)    Clarity (UA POC) Cloudy (none)    Glucose (UA POC) 1+ (none)    Bilirubin (UA POC) Negative (none)    Ketones (UA POC) Negative (none)    Specific gravity (UA POC) 1.020 1.001 - 1.035    Blood (UA POC) Negative (none)    pH (UA POC) 7.5 4.6 - 8.0    Protein (UA POC) neg Negative mg/dL    Urobilinogen (UA POC) 0.2 mg/dL     Nitrites (UA POC) Negative (none) Leukocyte esterase (UA POC) Negative (none)   AMB POC HEMOGLOBIN (HGB)   Result Value Ref Range    Hemoglobin (POC) 14.0        Immunizations administered during this encounter:   Immunization History   Administered Date(s) Administered    DTAP Vaccine 2001, 2001, 2001, 03/03/2003, 08/18/2005    HIB Vaccine 2001, 2001, 2001, 03/03/2003    HPV 10/27/2014    Hepatitis B Vaccine 2001, 2001, 03/03/2003    IPV 2001, 2001, 2001, 08/18/2005    Influenza Nasal Vaccine 10/27/2014    MMR Vaccine 03/03/2003, 08/18/2005    Meningococcal ACWY Vaccine 10/27/2014    Pneumococcal Vaccine (Pcv) 2001, 2001, 2001, 03/03/2003    TDAP Vaccine 08/10/2012    Varicella Virus Vaccine Live 03/03/2003       Screening for Metabolic Disorders for Patients on Antipsychotic Medications  (Data obtained from the EMR)    Estimated Body Mass Index  Estimated body mass index is 18.85 kg/m² as calculated from the following:    Height as of this encounter: 5' 4\" (1.626 m). Weight as of this encounter: 49.8 kg (109 lb 12.8 oz). Vital Signs/Blood Pressure  Visit Vitals  BP (!) 93/58   Pulse 84   Temp 97.8 °F (36.6 °C)   Resp 16   Ht 5' 4\" (1.626 m)   Wt 49.8 kg (109 lb 12.8 oz)   SpO2 95%   Breastfeeding No   BMI 18.85 kg/m²       Blood Glucose/Hemoglobin A1c  No results found for: GLU, GLUCPOC    No results found for: HBA1C, HGBE8, DCE0SHET     Lipid Panel  Lab Results   Component Value Date/Time    Cholesterol, total 191 (H) 08/10/2012 12:25 PM        Discharge Diagnosis: Mood disorder, unspecified; rule out bipolar II disorder, rule out borderline personality disorder, rule out recurrent major depression. Discharge Plan: she will be discharged in care of family to return to her brothers in [address]     The patient Nadeem Smart exhibits the ability to control behavior in a less restrictive environment. Patient's level of functioning is improving.   No assaultive/destructive behavior has been observed for the past 24 hours. No suicidal/homicidal threat or behavior has been observed for the past 24 hours. There is no evidence of serious medication side effects. Patient has not been in physical or protective restraints for at least the past 24 hours. If weapons involved, how are they secured? HANS spoke to her brother and he has secured the weapons in the home  - 4/5/2021     Is patient aware of and in agreement with discharge plan? She is aware of discharge and is in agreement. Arrangements for medication:  Prescriptions given to patient    Copy of discharge instructions to provider?:  Yes, fax to [place] [phone] for transportation home:  Family to      Keep all follow up appointments as scheduled, continue to take prescribed medications per physician instructions. Mental health crisis number:  882 or your local mental health crisis line number at [address] [facility] - [phone]. Discharge Medication List and Instructions:   Discharge Medication List as of 4/5/2021 12:10 PM      START taking these medications    Details   !! lamoTRIgine (LaMICtal) 25 mg tablet Take 1 tablet daily for 9 days, 1 tab BID for 7 days, 2 tabs BID for 14 days. Indications: mood, Print, Disp-79 Tab, R-0      traZODone (DESYREL) 100 mg tablet Take 1 Tab by mouth nightly. Indications: insomnia associated with depression, Print, Disp-30 Tab, R-1      !! lamoTRIgine (LaMICtal) 150 mg tablet Take 1 Tab by mouth daily. Indications: bipolar depression, Print, Disp-30 Tab, R-0       !! - Potential duplicate medications found. Please discuss with provider. CONTINUE these medications which have CHANGED    Details   hydrOXYzine HCL (ATARAX) 50 mg tablet Take 1 Tab by mouth every six (6) hours as needed for Anxiety.  Indications: anxious, Print, Disp-90 Tab, R-1             Unresulted Labs (24h ago, onward)    None        To obtain results of studies pending at discharge, please contact [phone]    Follow-up Information     Follow up With Specialties Details Why Contact Info    Edilia Guzmán MD Family Medicine   [facility] 13  [address]  On 5/24/2021 you will have a 10:00 am virtual  appointment with Juwan Henderson NP    [address] [facility] [address] [phone]           Advanced Directive:   Does the patient have an appointed surrogate decision maker? No  Does the patient have a Medical Advance Directive? No  Does the patient have a Psychiatric Advance Directive? No  If the patient does not have a surrogate or Medical Advance Directive AND Psychiatric Advance Directive, the patient was offered information on these advance directives Patient declined to complete    Patient Instructions: Please continue all medications until otherwise directed by physician. Tobacco Cessation Discharge Plan:   Is the patient a smoker and needs referral for smoking cessation? Yes  Patient referred to the following for smoking cessation with an appointment? Refused     Patient was offered medication to assist with smoking cessation at discharge? Refused  Was education for smoking cessation added to the discharge instructions? Yes    Alcohol/Substance Abuse Discharge Plan:   Does the patient have a history of substance/alcohol abuse and requires a referral for treatment? Yes  Patient referred to the following for substance/alcohol abuse treatment with an appointment? Yes  Patient was offered medication to assist with alcohol cessation at discharge? No  Was education for substance/alcohol abuse added to discharge instructions? No    Patient discharged to Home; discussed with patient/caregiver and provided to the patient/caregiver either in hard copy or electronically.

## 2021-04-05 NOTE — PROGRESS NOTES
Pt lying quietly in bed, appears to be sleeping. No distress noted. Respirations even and unlabored. Will continue to monitor. Problem: Falls - Risk of  Goal: *Absence of Falls  Description: Document Lear Shaker Fall Risk and appropriate interventions in the flowsheet.   Outcome: Progressing Towards Goal  Note: Fall Risk Interventions:            Medication Interventions: Teach patient to arise slowly

## 2021-04-05 NOTE — SUICIDE SAFETY PLAN
SAFETY PLAN    A suicide Safety Plan is a document that supports someone when they are having thoughts of suicide. Warning Signs that indicate a suicidal crisis may be developing: What (situations, thoughts, feelings, body sensations, behaviors, etc.) do you experience that lets you know you are beginning to think about suicide? 1. Break up  2. Feeling too stressed from work  3. Feeling lonely    Internal Coping Strategies:  What things can I do (relaxation techniques, hobbies, physical activities, etc.) to take my mind off my problems without contacting another person? 1. Going for a walk with Sarah  2. Hanging out with Friends  3. Doing yoga with Lisa Kiser and social settings that provide distraction: Who can I call or where can I go to distract me? 1. Name: Mom  Phone: [phone]  2. Name: Mayi  Phone: [phone]  3. Place: Going for a walk          4. Place: Laying in the sun    People whom I can ask for help: Who can I call when I need help - for example, friends, family, clergy, someone else? 1. Name: Mom            Phone: [phone]  2. Name: Mayi  Phone: [phone]  3. Name: Gildardo Banuelos Phone: in cell phone    Professionals or [place] agencies I can contact during a crisis: Who can I call for help - for example, my doctor, my psychiatrist, my psychologist, a mental health provider, a suicide hotline? 1. Clinician Name:    Phone:      Clinician Pager or Emergency Contact #:  [facility] Emergency - [phone].      2. Clinician Name: Gisele Murphy NP   Phone: [phone]       Clinician Pager or Emergency Contact #:     3. Suicide Prevention Lifeline: 2-114-705-TALK (6925)    4.  [address] [place] [address] Emergency Services -  for example, [facility] suicide hotline, [facility] Hotline:   [address]        Emergency Services Address: 911 or your local mental health crisis line number at [address] [place] [address] Emergency - [phone]. Emergency Services Phone: [address] , [facility] [address]      Making the environment safe: How can I make my environment (house/apartment/living space) safer? For example, can I remove guns, medications, and other items? 1. Lock up guns  2.  Take medication and start getting therapy

## 2021-04-05 NOTE — BH NOTES
GROUP THERAPY PROGRESS NOTE     Patient participated in community meeting & goals group. Group time: 45 min     Personal goal for participation: Assess mood & needs, discuss barriers to meeting needs, create strategy to achieve needs, set goal for the day. Goal orientation: Personal    Group therapy participation:  minimal      Therapeutic interventions reviewed and discussed: Group members were given opportunity to check-in by self-assessing their feelings and thoughts and setting a goal for the day. Group members engaged in conversation around strategies to meet their daily goal and get the most out of treatment team. They discuss needs, communication styles, barriers to communication and strategies to effectively advocated for their needs. Group members were encouraged to create SMART goals that were specific, measurable, achievable, realistic and timely. Impression of participation: Donna attended group but was minimal involved. She was alert, oriented, quiet and discharged focused. She shared her goal was to clean her apartment when she gets home, and spoke about the connection between her mental health and her living space.      HUBER Medrano, Supervisee in Social Work

## 2021-04-05 NOTE — DISCHARGE INSTRUCTIONS
DISCHARGE SUMMARY    NAME:Donna Steel  : 2000  MRN: [id number]    The patient Rivka Freitas exhibits the ability to control behavior in a less restrictive environment. Patient's level of functioning is improving. No assaultive/destructive behavior has been observed for the past 24 hours. No suicidal/homicidal threat or behavior has been observed for the past 24 hours. There is no evidence of serious medication side effects. Patient has not been in physical or protective restraints for at least the past 24 hours. If weapons involved, how are they secured? SW spoke to her brother and he has secured the weapons in the home  - 2021     Is patient aware of and in agreement with discharge plan? She is aware of discharge and is in agreement. Arrangements for medication:  Prescriptions given to patient    Copy of discharge instructions to provider?:  Yes, fax to [place] [phone] for transportation home:  Family to      Keep all follow up appointments as scheduled, continue to take prescribed medications per physician instructions. Mental health crisis number:  571 or your local mental health crisis line number at [address] [facility] Emergency - [phone]. DISCHARGE SUMMARY from Nurse    PATIENT INSTRUCTIONS:      What to do at Home:  Recommended activity: Activity as tolerated. *  Please give a list of your current medications to your Primary Care Provider. *  Please update this list whenever your medications are discontinued, doses are      changed, or new medications (including over-the-counter products) are added. *  Please carry medication information at all times in case of emergency situations. These are general instructions for a healthy lifestyle:    No smoking/ No tobacco products/ Avoid exposure to second hand smoke  Surgeon General's Warning:  Quitting smoking now greatly reduces serious risk to your health.     Obesity, smoking, and sedentary lifestyle greatly increases your risk for illness    A healthy diet, regular physical exercise & weight monitoring are important for maintaining a healthy lifestyle    You may be retaining fluid if you have a history of heart failure or if you experience any of the following symptoms:  Weight gain of 3 pounds or more overnight or 5 pounds in a week, increased swelling in our hands or feet or shortness of breath while lying flat in bed. Please call your doctor as soon as you notice any of these symptoms; do not wait until your next office visit. The discharge information has been reviewed with the patient. The patient verbalized understanding. Discharge medications reviewed with the patient and appropriate educational materials and side effects teaching were provided.   ___________________________________________________________________________________________________________________________________      Mental Health Emergency WARM LINE      [phone] ([phone])      M-F: 9am to 9pm      Sat & Sun: 5pm - 9pm  National suicide prevention lines:                             3-191-SBISSQD (9-899-037-814-247-3324)       2-220-300-TALK ([phone])   24/7 Crisis Text Line:  Text HOME to 647990

## 2021-04-05 NOTE — BH NOTES
Pt discharged with grandmother. No distress noted. All belongings returned to pt. Discharge instructions reviewed and signed with pt.

## 2021-04-05 NOTE — PROGRESS NOTES
Problem: Depressed Mood (Adult/Pediatric)  Goal: *STG: Participates in treatment plan  Note: Patient participated in treatment plan. Patient was smiling, stated \"I feel really good, and that's a first for me in a while. \"  Discharge and medications discussed. Patient expressed readiness for discharge.

## 2021-08-03 PROBLEM — F32.A DEPRESSION: Status: RESOLVED | Noted: 2017-06-09 | Resolved: 2021-08-03

## 2022-03-19 PROBLEM — F32.9 MDD (MAJOR DEPRESSIVE DISORDER): Status: ACTIVE | Noted: 2021-04-01

## 2022-03-19 PROBLEM — F32.2 SEVERE SINGLE CURRENT EPISODE OF MAJOR DEPRESSIVE DISORDER, WITHOUT PSYCHOTIC FEATURES (HCC): Status: ACTIVE | Noted: 2017-06-26

## 2022-05-04 LAB — SARS-COV-2, NAA: NOT DETECTED

## 2022-09-01 ENCOUNTER — HOSPITAL ENCOUNTER (EMERGENCY)
Age: 22
Discharge: HOME | End: 2022-09-01
Attending: EMERGENCY MEDICINE
Payer: MEDICAID

## 2022-09-01 ENCOUNTER — APPOINTMENT (OUTPATIENT)
Dept: GENERAL RADIOLOGY | Age: 22
End: 2022-09-01
Attending: EMERGENCY MEDICINE
Payer: MEDICAID

## 2022-09-01 VITALS
BODY MASS INDEX: 20.49 KG/M2 | OXYGEN SATURATION: 99 % | HEIGHT: 64 IN | DIASTOLIC BLOOD PRESSURE: 62 MMHG | TEMPERATURE: 98.1 F | RESPIRATION RATE: 20 BRPM | WEIGHT: 120 LBS | HEART RATE: 84 BPM | SYSTOLIC BLOOD PRESSURE: 98 MMHG
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
## 2022-09-01 DIAGNOSIS — S70.01XA CONTUSION OF RIGHT HIP AND THIGH, INITIAL ENCOUNTER: ICD-10-CM

## 2022-09-01 DIAGNOSIS — T07.XXXA MULTIPLE ABRASIONS: ICD-10-CM

## 2022-09-01 DIAGNOSIS — S70.11XA CONTUSION OF RIGHT HIP AND THIGH, INITIAL ENCOUNTER: ICD-10-CM

## 2022-09-01 LAB — HCG UR QL: NEGATIVE

## 2022-09-01 PROCEDURE — 73552 X-RAY EXAM OF FEMUR 2/>: CPT

## 2022-09-01 PROCEDURE — 74011250637 HC RX REV CODE- 250/637: Performed by: EMERGENCY MEDICINE

## 2022-09-01 PROCEDURE — 73502 X-RAY EXAM HIP UNI 2-3 VIEWS: CPT

## 2022-09-01 PROCEDURE — 99283 EMERGENCY DEPT VISIT LOW MDM: CPT

## 2022-09-01 PROCEDURE — 81025 URINE PREGNANCY TEST: CPT

## 2022-09-01 PROCEDURE — 72100 X-RAY EXAM L-S SPINE 2/3 VWS: CPT

## 2022-09-01 RX ORDER — ONDANSETRON 4 MG/1
4 TABLET, ORALLY DISINTEGRATING ORAL
Status: COMPLETED | OUTPATIENT
Start: 2022-09-01 | End: 2022-09-01

## 2022-09-01 RX ORDER — HYDROXYZINE 50 MG/1
50 TABLET, FILM COATED ORAL
COMMUNITY

## 2022-09-01 RX ORDER — OXYCODONE AND ACETAMINOPHEN 5; 325 MG/1; MG/1
1 TABLET ORAL ONCE
Status: COMPLETED | OUTPATIENT
Start: 2022-09-01 | End: 2022-09-01

## 2022-09-01 RX ADMIN — ONDANSETRON 4 MG: 4 TABLET, ORALLY DISINTEGRATING ORAL at 08:18

## 2022-09-01 RX ADMIN — OXYCODONE HYDROCHLORIDE AND ACETAMINOPHEN 1 TABLET: 5; 325 TABLET ORAL at 05:45

## 2022-09-01 NOTE — Clinical Note
[address] EMERGENCY DEPT  [address] [id number]  [phone]    Work/School Note    Date: 9/1/2022    To Whom It May concern:    Kimberli Graham was seen and treated today in the emergency room by the following provider(s):  Attending Provider: David Vogel DO. Kimberli Graham is excused from work/school on 09/01/22 and 09/02/22. She is medically clear to return to work/school on 9/3/2022.        Sincerely,          Ronni Burnett MD

## 2022-09-01 NOTE — ED PROVIDER NOTES
60-year-old female with no significant past medical history, but history of depression, anxiety, bipolar disorder, presents to the emergency department stating that she fell down approximately 10 steps shortly prior to arrival landing on her rear end and right hip. She notes pain in her right flank right hip and right upper leg and was unable to ambulate after the fall. She required assistance on arrival to the ED to be extracted from the vehicle she arrived in. She denies any other injuries. Denies any history of prior leg injuries, fractures, or surgeries. She has not taken anything for her symptoms. Past Medical History:   Diagnosis Date    Depression     Functional murmur     Self mutilating behavior        No past surgical history on file. History reviewed. No pertinent family history. Social History     Socioeconomic History    Marital status: SINGLE     Spouse name: Not on file    Number of children: Not on file    Years of education: Not on file    Highest education level: Not on file   Occupational History    Not on file   Tobacco Use    Smoking status: Some Days    Smokeless tobacco: Current   Vaping Use    Vaping Use: Some days   Substance and Sexual Activity    Alcohol use:  Yes    Drug use: Not Currently     Types: Marijuana, Cocaine    Sexual activity: Not on file   Other Topics Concern     Service Not Asked    Blood Transfusions Not Asked    Caffeine Concern Not Asked    Occupational Exposure Not Asked    Hobby Hazards Not Asked    Sleep Concern Not Asked    Stress Concern Not Asked    Weight Concern Not Asked    Special Diet Not Asked    Back Care Not Asked    Exercise Not Asked    Bike Helmet Not Asked   [address] Not Asked    Self-Exams Not Asked   Social History Narrative    Not on file     Social Determinants of Health     Financial Resource Strain: Not on file   Food Insecurity: Not on file   Transportation Needs: Not on file   Physical Activity: Not on file   Stress: Not on file   Social Connections: Not on file   Intimate Partner Violence: Not on file   Housing Stability: Not on file         ALLERGIES: Latex, natural rubber; Pcn [penicillins]; and Tree nut    Review of Systems   Constitutional:  Negative for activity change, appetite change, chills and fever. HENT:  Negative for congestion, rhinorrhea, sinus pressure, sneezing and sore throat. Eyes:  Negative for photophobia and visual disturbance. Respiratory:  Negative for cough and shortness of breath. Cardiovascular:  Negative for chest pain. Gastrointestinal:  Negative for abdominal pain, blood in stool, constipation, diarrhea, nausea and vomiting. Genitourinary:  Negative for difficulty urinating, dysuria, flank pain, frequency, hematuria, menstrual problem, urgency, vaginal bleeding and vaginal discharge. Musculoskeletal:  Positive for arthralgias and gait problem. Negative for back pain, joint swelling, myalgias and neck pain. Skin:  Negative for rash and wound. Neurological:  Negative for syncope, weakness, numbness and headaches. Psychiatric/Behavioral:  Negative for self-injury and suicidal ideas. All other systems reviewed and are negative. Vitals:    09/01/22 0532 09/01/22 0544   BP: 120/83 128/82   Pulse: 93    Resp: 20    Temp: 98.1 °F (36.7 °C)    SpO2: 98% 100%   Weight: 54.4 kg (120 lb)    Height: 5' 4\" (1.626 m)             Physical Exam  Vitals and nursing note reviewed. Constitutional:       General: She is not in acute distress. Appearance: Normal appearance. She is well-developed. She is not diaphoretic. HENT:      Head: Normocephalic and atraumatic. Nose: Nose normal.   Eyes:      Extraocular Movements: Extraocular movements intact. Conjunctiva/sclera: Conjunctivae normal.      Pupils: Pupils are equal, round, and reactive to light. Cardiovascular:      Rate and Rhythm: Normal rate and regular rhythm.       Heart sounds: Normal heart sounds. Pulmonary:      Effort: Pulmonary effort is normal.      Breath sounds: Normal breath sounds. Abdominal:      General: There is no distension. Palpations: Abdomen is soft. Tenderness: There is no abdominal tenderness. Musculoskeletal:      Cervical back: Neck supple. Back:       Right hip: Tenderness and bony tenderness present. No deformity or lacerations. Decreased range of motion. Right upper leg: Tenderness and bony tenderness present. No swelling, deformity or lacerations. Right knee: No swelling or deformity. Decreased range of motion. Tenderness present. Normal pulse. Right ankle: Normal.        Legs:    Skin:     General: Skin is warm and dry. Neurological:      General: No focal deficit present. Mental Status: She is alert and oriented to person, place, and time. Cranial Nerves: No cranial nerve deficit. Sensory: No sensory deficit. Motor: No weakness. Coordination: Coordination normal.        MDM    50-year-old female presents after fall downstairs. hCG negative    X-rays were reviewed by myself and read by radiology showing no acute bony abnormalities. Likely contusion and abrasions. Reassurance was given recommended OTC pain relievers and ice. Patient requests crutches to help her get around which were provided. Recommended PCP follow-up as needed. Please note that this dictation was completed with [employer], the [employer] voice recognition software. Quite often unanticipated grammatical, syntax, homophones, and other interpretive errors are inadvertently transcribed by the computer software. Please disregard these errors. Please excuse any errors that have escaped final proofreading.     Procedures

## 2022-09-01 NOTE — ED NOTES
Pain reassessed after pt returned from xray, rates pain 7/10, pt stated \"I don't want anymore pain medicine because I have been drinking. \"

## 2022-09-01 NOTE — ED NOTES
Pt stated she needed to urinate, advised pt fracture pan would be used, pt agreeable, pt able to lift herself on and off fracture pain without any difficulty.

## 2022-09-01 NOTE — ED TRIAGE NOTES
Pt reports that she feel down approx. 10 steps. Arrives in private vehicle in the back. Unable to ambulate. Pt placed on backboard and extracted from vehicle. Pt complains of right hip and upper right leg pain.

## 2022-09-01 NOTE — ED NOTES
Pt states \"I think this Percocet is making me nauseated. \" Dr Eileen Singh made aware and pt medicated with Zofran as ordered. Pt requested a note for work for 2 days. Pt given note for work written by Dr Eileen Singh. Pt given Ice packs for pain areas right upper leg and right lower back.

## 2022-09-01 NOTE — ED NOTES
Upon discharging pt, pt refusing to get up, stating she was in too much pain, paper pants given and crutches at the bedside, pt refusing to move, charge nurse into room to speak to pt, pt raising voice to staff, stating she will have her friend dress her, refused teaching on crutch use.

## 2022-09-01 NOTE — ED NOTES
Pt was able to slowly sit up to edge of bed nurse demonstrated use and encouraged to use crutches to walk to bathroom. Pt stood with crutches then requested wheelchair out to car, \"I don't feel I need to use the bathroom any more my adrenalin is up I can go when I am at home. \" Pt wheeled out to her Jeep pt able to stand and pivot on left leg to get her self into front passenger seat. Driven home by her friend.

## 2022-09-01 NOTE — ED NOTES
Went into assist pt to get up out of bed into the wheel chair. I attempted to sit the head of the bed up slowly and the pt at that point started speaking to me with a loud voice. PT yelled \"fuck that hurts. \" Pt is very argumentative and continues to yell at this RN, I asked pt where she was hurting and she states her lower back, it fucking hurts. \" I asked pt not to curse at me. Pt again in a raised voice yelled \"It fucking hurts. I then attempted to explain to pt that I understand she is going to be sore and hurt, there is nothing broken and with that I said I am not saying you don't hurt, I am sure you do but, we need to get you up and move as your muscles will just get stiffer and you will hurt more. Pt crying at this point. Josias Carlisle RN then attempted to explain to the pt that she needed to start moving and that we would help her, pts friend wanted us to pick the pt up and place her in the [facility] [place]. Josias Carlisel explained that we couldn't pick her up as she needed to start moving to help herself. She explained she had been administered pain medicine and we were not  discrediting her pain. The pt at that point would not allow staff to assist her, Josias Carlisle at that point requested the pts friend assist her and her friend at this point stated \" you should do your job better. \" Josias Carlisle at that point requested the pt to go and get the car. Pts friend continued to be argumentative and refused to leave.

## 2022-09-01 NOTE — ED NOTES
Attempted to assist nurse with discharging pt. Pt argumentative and reports that she is in too much pain to move, refusing to allow this RN to touch her. Asked her friend to help her, but friend refused, reports Cheng Phippsabril you should do your job better\". Explained to pt that the findings show nothing broken or fractured and that pain is expected, she received a narcotic previously. Asked friend to go get the car and pull up to front, friend belligerent and refused to leave. Pt continues to be argumentative and refusing to leave. Safety concern identified by nursing, reported that law enforcement would be called. Jelani Isbell, Nursing Director in room to attempt to diffuse situation.

## 2022-09-04 ENCOUNTER — HOSPITAL ENCOUNTER (EMERGENCY)
Age: 22
Discharge: HOME | End: 2022-09-04
Attending: EMERGENCY MEDICINE
Payer: MEDICAID

## 2022-09-04 VITALS
HEART RATE: 83 BPM | RESPIRATION RATE: 18 BRPM | TEMPERATURE: 97.8 F | DIASTOLIC BLOOD PRESSURE: 62 MMHG | OXYGEN SATURATION: 99 % | SYSTOLIC BLOOD PRESSURE: 116 MMHG | BODY MASS INDEX: 20.62 KG/M2 | WEIGHT: 128.31 LBS | HEIGHT: 66 IN

## 2022-09-04 DIAGNOSIS — M62.830 LUMBAR PARASPINAL MUSCLE SPASM: ICD-10-CM

## 2022-09-04 DIAGNOSIS — L23.7 POISON IVY DERMATITIS: ICD-10-CM

## 2022-09-04 DIAGNOSIS — S33.5XXA LUMBAR SPRAIN, INITIAL ENCOUNTER: Primary | ICD-10-CM

## 2022-09-04 PROCEDURE — 99283 EMERGENCY DEPT VISIT LOW MDM: CPT

## 2022-09-04 RX ORDER — MAG HYDROX/ALUMINUM HYD/SIMETH 200-200-20
SUSPENSION, ORAL (FINAL DOSE FORM) ORAL 2 TIMES DAILY
Qty: 30 G | Refills: 0 | Status: SHIPPED | OUTPATIENT
Start: 2022-09-04

## 2022-09-04 RX ORDER — METHOCARBAMOL 750 MG/1
750 TABLET, FILM COATED ORAL 4 TIMES DAILY
Qty: 20 TABLET | Refills: 0 | Status: SHIPPED | OUTPATIENT
Start: 2022-09-04 | End: 2022-09-09

## 2022-09-04 RX ORDER — DICLOFENAC POTASSIUM 50 MG/1
50 TABLET, FILM COATED ORAL 3 TIMES DAILY
Qty: 21 TABLET | Refills: 0 | Status: SHIPPED | OUTPATIENT
Start: 2022-09-04

## 2022-09-04 RX ORDER — IBUPROFEN 800 MG/1
800 TABLET ORAL
COMMUNITY

## 2022-09-04 NOTE — Clinical Note
[address] EMERGENCY DEPT  [address] [id number]  [phone]    Work/School Note    Date: 9/4/2022    To Whom It May concern:      Abbie Manning was seen and treated today in the emergency room by the following provider(s):  Attending Provider: Elaina Gilmore MD.      Abbie Manning is excused from work/school on 09/04/22. She is clear to return to work/school on 09/05/22.         Sincerely,          Rasheeda Mcmullen MD

## 2022-09-04 NOTE — ED NOTES
Pt was discharged and given instructions by Dr Jessenia Moe. Pt verbalized good understanding of all discharge instructions,prescriptions and F/U care. All questions answered. Pt given a note for work written by Dr Jessenia Moe. Pt in stable condition on discharge. Pt ambulated out of ER with a steady gait accompanied by her friend.

## 2022-09-04 NOTE — ED PROVIDER NOTES
67-year-old female presents from home accompanied by a friend with complaint of persistent lumbar back pain and spasm. She fell down several steps a couple of days ago. Landed mostly on her right buttock. She was seen here at that time and had x-rays done that were unremarkable. She is been taking over-the-counter ibuprofen since then but still is experiencing sharp spikes of pain and tightness. Pain radiates from her right lower back down to her buttock but does not radiate down her leg. No urinary symptoms. No focal weakness or numbness. Pain is worsened with particular movements. Patient adds that she noticed a rash in her right upper arm that is been itching for the past few days. She is suspicious of poison ivy. Past Medical History:   Diagnosis Date    Depression     Fall     Functional murmur     Self mutilating behavior        History reviewed. No pertinent surgical history. History reviewed. No pertinent family history.     Social History     Socioeconomic History    Marital status: SINGLE     Spouse name: Not on file    Number of children: Not on file    Years of education: Not on file    Highest education level: Not on file   Occupational History    Not on file   Tobacco Use    Smoking status: Never    Smokeless tobacco: Never   Vaping Use    Vaping Use: Some days   Substance and Sexual Activity    Alcohol use: Yes     Comment: socially    Drug use: Yes     Types: Marijuana    Sexual activity: Not on file   Other Topics Concern     Service Not Asked    Blood Transfusions Not Asked    Caffeine Concern Not Asked    Occupational Exposure Not Asked    Hobby Hazards Not Asked    Sleep Concern Not Asked    Stress Concern Not Asked    Weight Concern Not Asked    Special Diet Not Asked    Back Care Not Asked    Exercise Not Asked    Bike Helmet Not Asked    Seat Belt Not Asked    Self-Exams Not Asked   Social History Narrative    Not on file     Social Determinants of Health Financial Resource Strain: Not on file   Food Insecurity: Not on file   Transportation Needs: Not on file   Physical Activity: Not on file   Stress: Not on file   Social Connections: Not on file   Intimate Partner Violence: Not on file   Housing Stability: Not on file         ALLERGIES: Latex, natural rubber; Pcn [penicillins]; and Tree nut    Review of Systems   Constitutional:  Negative for fever. HENT:  Negative for facial swelling. Eyes:  Negative for visual disturbance. Respiratory:  Negative for chest tightness. Cardiovascular:  Negative for chest pain. Gastrointestinal:  Negative for abdominal pain. Genitourinary:  Negative for difficulty urinating and dysuria. Musculoskeletal:  Negative for arthralgias. Skin:  Negative for rash. Neurological:  Negative for headaches. Hematological:  Negative for adenopathy. Psychiatric/Behavioral:  Negative for suicidal ideas. Vitals:    09/04/22 1256   BP: 116/62   Pulse: 83   Resp: 18   Temp: 97.8 °F (36.6 °C)   SpO2: 99%   Weight: 58.2 kg (128 lb 4.9 oz)   Height: 5' 6\" (1.676 m)            Physical Exam  Vitals and nursing note reviewed. Constitutional:       General: She is not in acute distress. Appearance: She is well-developed. HENT:      Head: Normocephalic and atraumatic. Eyes:      General: No scleral icterus. Conjunctiva/sclera: Conjunctivae normal.      Pupils: Pupils are equal, round, and reactive to light. Cardiovascular:      Rate and Rhythm: Normal rate. Heart sounds: No murmur heard. Pulmonary:      Effort: Pulmonary effort is normal. No respiratory distress. Abdominal:      General: There is no distension. Musculoskeletal:         General: Normal range of motion. Cervical back: Normal range of motion and neck supple. Skin:     General: Skin is warm and dry. Findings: Rash (Red rash in the right upper extremity consistent with a contact dermatitis.) present.    Neurological:      Mental Status: She is alert and oriented to person, place, and time. MDM  Number of Diagnoses or Management Options  Lumbar paraspinal muscle spasm  Lumbar sprain, initial encounter  Poison ivy dermatitis  Diagnosis management comments: Assessment: Patient with injury to her lower back. Sounds like muscle spasms possibly a pinched nerve. No evidence of acute cord compression. We will continue to treat with NSAIDs and add a muscle relaxer for comfort. Gentle stretching exercises. I also wrote a prescription for hydrocortisone to help with her poison ivy of her right arm. She is to return to the ER with worsening symptoms.            Procedures

## 2022-09-04 NOTE — ED TRIAGE NOTES
Pt ambulates to treatment area she states that 3 days ago she fell down 20 wooden stairs and was seen here for the fall. She returns today because she continues with pain to the right lower back, hip and upper leg. She states that her pain is fine with sitting but when she gets up and moves it hurts worse. She has been icing the area and taking Ibuprofen.

## 2023-04-28 ENCOUNTER — NURSE TRIAGE (OUTPATIENT)
Dept: OTHER | Facility: CLINIC | Age: 23
End: 2023-04-28

## 2023-04-28 NOTE — TELEPHONE ENCOUNTER
Location of patient: [address] campbell from [place] at [facility] with [employer] ([employer]); Patient with Red Flag Complaint requesting to establish care with Gilberto DUNBAR. Subjective: Caller states \"stomach pain and chest pain\"     Current Symptoms: Seen yesterday at [facility] for stomach pain and chest pain. Has a sharp pain in middle of upper stomach below chest   Will come and go, last about 45 mins  Has been seen a few times and states unsure what is causing it. Started about a month ago with a cough and congestion, that went away and then these symptoms started. Denies pain now. Pain normally comes in afternoon and at night. Onset: 1 month ago; intermittent    Associated Symptoms: NA    Pain Severity: no pain at this time. When pain comes on can be a 9/10    Temperature: denies     What has been tried: tums and tylenol    LMP: NA Pregnant: No    Recommended disposition: See in Office Today or Tomorrow    Care advice provided, patient verbalizes understanding; denies any other questions or concerns; instructed to call back for any new or worsening symptoms. Patient/Caller agrees with recommended disposition; writer provided warm transfer to [place] at [facility] for appointment scheduling    Attention Provider: Thank you for allowing me to participate in the care of your patient. The patient was connected to triage in response to information provided to the [facility]. Please do not respond through this encounter as the response is not directed to a shared pool.     Reason for Disposition   MILD TO MODERATE pain that comes and goes (cramps) lasts > 24 hours    Protocols used: Abdominal Pain - Upper-ADULT-[place]

## 2023-05-17 RX ORDER — HYDROXYZINE 50 MG/1
50 TABLET, FILM COATED ORAL EVERY 8 HOURS PRN
COMMUNITY

## 2023-05-17 RX ORDER — DICLOFENAC POTASSIUM 50 MG/1
50 TABLET, FILM COATED ORAL 3 TIMES DAILY
COMMUNITY
Start: 2022-09-04

## 2023-05-17 RX ORDER — IBUPROFEN 800 MG/1
800 TABLET ORAL
COMMUNITY

## 2023-05-17 RX ORDER — DIAPER,BRIEF,INFANT-TODD,DISP
EACH MISCELLANEOUS 2 TIMES DAILY
COMMUNITY
Start: 2022-09-04

## 2023-05-31 NOTE — PROGRESS NOTES
[address] [facility] [address] [place] [address]   Office [phone], Fax [phone]      Chief Complaint:   Roselia Huynh is a 25 y.o. female that presents for: epigastric pain. Assessment/Plan:     Teresa was seen today for new patient, abdominal pain, chest pain and congestion. Diagnoses and all orders for this visit:    Epigastric pain: Subacute, intermittent episodes of sharp, stabbing pain for 1 month, not associated with diet or bowel movements. Differential includes GERD, PUD, gastritis, biliary colic. Longstanding chronic history of marijuana use could cause cannabinoid related abdominal pain, even with cessation roughly 3 months ago. Moderate and regular NSAID use could also be contributing. Significant psychiatric history of depression/anxiety also likely contributing, but this is a diagnosis of exclusion.  -     famotidine (PEPCID) 20 MG tablet; Take 1 tablet by mouth 2 times daily  -     AMB POC URINE PREGNANCY TEST, VISUAL COLOR COMPARISON - NEGATIVE  - Counseled on avoiding NSAIDs if possible, using Tylenol instead  - Continue to avoid marijuana  - If no improvement with trial of Pepcid twice daily for 2 weeks, further work-up warranted. Consider celiac panel, TSH, RUQ U/S  - [facility] already [facility]  - Referred to [facility] [employer] and provided list of outpatient counselors/therapists in the area       Follow up:   Return in about 3 weeks (around 6/22/2023) for follow up abdominal pain with Dr. Michael Aaron. Subjective:   HPI:  Roselia Huynh is a 25 y.o. female who presents to clinic for evaluation of epigastric pain. #Epigastric pain:  - 1 month duration  - Describes as sharp, stabbing pain that spontaneously occurs  - No associated n/v/d/c  - Not associated with food  - 5 episodes/day on average that last about 30 min.  Then goes away with being still  - Does take Advil frequently for headaches - usually 2 tabs of 20 mg TID  - Never

## 2023-06-01 ENCOUNTER — OFFICE VISIT (OUTPATIENT)
Age: 23
End: 2023-06-01
Payer: MEDICAID

## 2023-06-01 VITALS
HEIGHT: 66 IN | SYSTOLIC BLOOD PRESSURE: 107 MMHG | BODY MASS INDEX: 21.44 KG/M2 | HEART RATE: 87 BPM | DIASTOLIC BLOOD PRESSURE: 76 MMHG | OXYGEN SATURATION: 100 % | WEIGHT: 133.4 LBS | TEMPERATURE: 97.8 F

## 2023-06-01 DIAGNOSIS — R10.13 EPIGASTRIC PAIN: Primary | ICD-10-CM

## 2023-06-01 PROCEDURE — 99213 OFFICE O/P EST LOW 20 MIN: CPT

## 2023-06-01 RX ORDER — FAMOTIDINE 20 MG/1
20 TABLET, FILM COATED ORAL 2 TIMES DAILY
Qty: 30 TABLET | Refills: 1 | Status: SHIPPED | OUTPATIENT
Start: 2023-06-01

## 2023-06-01 SDOH — ECONOMIC STABILITY: FOOD INSECURITY: WITHIN THE PAST 12 MONTHS, YOU WORRIED THAT YOUR FOOD WOULD RUN OUT BEFORE YOU GOT MONEY TO BUY MORE.: NEVER TRUE

## 2023-06-01 SDOH — ECONOMIC STABILITY: INCOME INSECURITY: HOW HARD IS IT FOR YOU TO PAY FOR THE VERY BASICS LIKE FOOD, HOUSING, MEDICAL CARE, AND HEATING?: NOT HARD AT ALL

## 2023-06-01 SDOH — ECONOMIC STABILITY: FOOD INSECURITY: WITHIN THE PAST 12 MONTHS, THE FOOD YOU BOUGHT JUST DIDN'T LAST AND YOU DIDN'T HAVE MONEY TO GET MORE.: NEVER TRUE

## 2023-06-01 SDOH — ECONOMIC STABILITY: HOUSING INSECURITY
IN THE LAST 12 MONTHS, WAS THERE A TIME WHEN YOU DID NOT HAVE A STEADY PLACE TO SLEEP OR SLEPT IN A SHELTER (INCLUDING NOW)?: NO

## 2023-06-01 ASSESSMENT — PATIENT HEALTH QUESTIONNAIRE - PHQ9
SUM OF ALL RESPONSES TO PHQ QUESTIONS 1-9: 17
10. IF YOU CHECKED OFF ANY PROBLEMS, HOW DIFFICULT HAVE THESE PROBLEMS MADE IT FOR YOU TO DO YOUR WORK, TAKE CARE OF THINGS AT HOME, OR GET ALONG WITH OTHER PEOPLE: 1
3. TROUBLE FALLING OR STAYING ASLEEP: 3
SUM OF ALL RESPONSES TO PHQ QUESTIONS 1-9: 17
6. FEELING BAD ABOUT YOURSELF - OR THAT YOU ARE A FAILURE OR HAVE LET YOURSELF OR YOUR FAMILY DOWN: 3
7. TROUBLE CONCENTRATING ON THINGS, SUCH AS READING THE NEWSPAPER OR WATCHING TELEVISION: 3
SUM OF ALL RESPONSES TO PHQ9 QUESTIONS 1 & 2: 2
9. THOUGHTS THAT YOU WOULD BE BETTER OFF DEAD, OR OF HURTING YOURSELF: 0
SUM OF ALL RESPONSES TO PHQ QUESTIONS 1-9: 17
SUM OF ALL RESPONSES TO PHQ QUESTIONS 1-9: 17
8. MOVING OR SPEAKING SO SLOWLY THAT OTHER PEOPLE COULD HAVE NOTICED. OR THE OPPOSITE, BEING SO FIGETY OR RESTLESS THAT YOU HAVE BEEN MOVING AROUND A LOT MORE THAN USUAL: 0
1. LITTLE INTEREST OR PLEASURE IN DOING THINGS: 1
2. FEELING DOWN, DEPRESSED OR HOPELESS: 1
5. POOR APPETITE OR OVEREATING: 3
4. FEELING TIRED OR HAVING LITTLE ENERGY: 3

## 2023-06-01 NOTE — PROGRESS NOTES
Identified pt with two pt identifiers(name and ). Reviewed record in preparation for visit and have obtained necessary documentation. Chief Complaint   Patient presents with    New Patient    Abdominal Pain     1month    Chest Pain    Congestion     1 month        Health Maintenance Due   Topic    COVID-19 Vaccine (1)    Depression Monitoring     Varicella vaccine (2 of 2 - 2-dose childhood series)    HPV vaccine (2 - 2-dose series)    HIV screen     Chlamydia/GC screen     Hepatitis C screen     Pap smear     DTaP/Tdap/Td vaccine (7 - Td or Tdap)       There were no vitals filed for this visit. Coordination of Care Questionnaire:  :   1) Have you been to an emergency room, urgent care, or hospitalized since your last visit? If yes, where when, and reason for visit? No    2. Have seen or consulted any other health care provider since your last visit? If yes, where when, and reason for visit? No      3)   Patient is accompanied by self I have received verbal consent from Karo Schaeffer to discuss any/all medical information while they are present in the room.

## 2023-06-27 ENCOUNTER — OFFICE VISIT (OUTPATIENT)
Age: 23
End: 2023-06-27
Payer: MEDICAID

## 2023-06-27 VITALS
SYSTOLIC BLOOD PRESSURE: 105 MMHG | HEART RATE: 74 BPM | HEIGHT: 64 IN | OXYGEN SATURATION: 99 % | TEMPERATURE: 97.5 F | WEIGHT: 132.8 LBS | DIASTOLIC BLOOD PRESSURE: 72 MMHG | BODY MASS INDEX: 22.67 KG/M2

## 2023-06-27 DIAGNOSIS — R10.13 EPIGASTRIC PAIN: Primary | ICD-10-CM

## 2023-06-27 PROCEDURE — 99212 OFFICE O/P EST SF 10 MIN: CPT

## 2023-06-27 ASSESSMENT — ANXIETY QUESTIONNAIRES
2. NOT BEING ABLE TO STOP OR CONTROL WORRYING: 3
5. BEING SO RESTLESS THAT IT IS HARD TO SIT STILL: 1
IF YOU CHECKED OFF ANY PROBLEMS ON THIS QUESTIONNAIRE, HOW DIFFICULT HAVE THESE PROBLEMS MADE IT FOR YOU TO DO YOUR WORK, TAKE CARE OF THINGS AT HOME, OR GET ALONG WITH OTHER PEOPLE: SOMEWHAT DIFFICULT
4. TROUBLE RELAXING: 2
3. WORRYING TOO MUCH ABOUT DIFFERENT THINGS: 3
6. BECOMING EASILY ANNOYED OR IRRITABLE: 3
GAD7 TOTAL SCORE: 16
7. FEELING AFRAID AS IF SOMETHING AWFUL MIGHT HAPPEN: 1
1. FEELING NERVOUS, ANXIOUS, OR ON EDGE: 3

## 2023-06-27 ASSESSMENT — PATIENT HEALTH QUESTIONNAIRE - PHQ9
9. THOUGHTS THAT YOU WOULD BE BETTER OFF DEAD, OR OF HURTING YOURSELF: 0
SUM OF ALL RESPONSES TO PHQ QUESTIONS 1-9: 5
3. TROUBLE FALLING OR STAYING ASLEEP: 1
7. TROUBLE CONCENTRATING ON THINGS, SUCH AS READING THE NEWSPAPER OR WATCHING TELEVISION: 1
1. LITTLE INTEREST OR PLEASURE IN DOING THINGS: 1
4. FEELING TIRED OR HAVING LITTLE ENERGY: 1
SUM OF ALL RESPONSES TO PHQ9 QUESTIONS 1 & 2: 1
2. FEELING DOWN, DEPRESSED OR HOPELESS: 0
5. POOR APPETITE OR OVEREATING: 1
SUM OF ALL RESPONSES TO PHQ QUESTIONS 1-9: 5
SUM OF ALL RESPONSES TO PHQ QUESTIONS 1-9: 5
6. FEELING BAD ABOUT YOURSELF - OR THAT YOU ARE A FAILURE OR HAVE LET YOURSELF OR YOUR FAMILY DOWN: 0
8. MOVING OR SPEAKING SO SLOWLY THAT OTHER PEOPLE COULD HAVE NOTICED. OR THE OPPOSITE, BEING SO FIGETY OR RESTLESS THAT YOU HAVE BEEN MOVING AROUND A LOT MORE THAN USUAL: 0
SUM OF ALL RESPONSES TO PHQ QUESTIONS 1-9: 5
10. IF YOU CHECKED OFF ANY PROBLEMS, HOW DIFFICULT HAVE THESE PROBLEMS MADE IT FOR YOU TO DO YOUR WORK, TAKE CARE OF THINGS AT HOME, OR GET ALONG WITH OTHER PEOPLE: 1